# Patient Record
Sex: FEMALE | Race: WHITE | NOT HISPANIC OR LATINO | URBAN - METROPOLITAN AREA
[De-identification: names, ages, dates, MRNs, and addresses within clinical notes are randomized per-mention and may not be internally consistent; named-entity substitution may affect disease eponyms.]

---

## 2017-12-13 ENCOUNTER — OFFICE (OUTPATIENT)
Dept: URBAN - METROPOLITAN AREA CLINIC 64 | Facility: CLINIC | Age: 62
End: 2017-12-13

## 2017-12-13 VITALS
WEIGHT: 161 LBS | SYSTOLIC BLOOD PRESSURE: 133 MMHG | HEART RATE: 88 BPM | DIASTOLIC BLOOD PRESSURE: 85 MMHG | HEIGHT: 65 IN

## 2017-12-13 DIAGNOSIS — R10.12 LEFT UPPER QUADRANT PAIN: ICD-10-CM

## 2017-12-13 DIAGNOSIS — K62.5 HEMORRHAGE OF ANUS AND RECTUM: ICD-10-CM

## 2017-12-13 DIAGNOSIS — K30 FUNCTIONAL DYSPEPSIA: ICD-10-CM

## 2017-12-13 PROCEDURE — 99202 OFFICE O/P NEW SF 15 MIN: CPT | Performed by: NURSE PRACTITIONER

## 2017-12-13 RX ORDER — HYDROCORTISONE ACETATE 25 MG/1
25 SUPPOSITORY RECTAL
Qty: 10 | Refills: 1 | Status: COMPLETED
Start: 2017-12-13 | End: 2021-11-19

## 2017-12-13 RX ORDER — ONDANSETRON HYDROCHLORIDE 4 MG/1
16 TABLET, ORALLY DISINTEGRATING ORAL
Qty: 60 | Refills: 3 | Status: COMPLETED
Start: 2017-12-13 | End: 2021-11-19

## 2018-02-12 ENCOUNTER — HOSPITAL ENCOUNTER (OUTPATIENT)
Dept: PREOP | Facility: HOSPITAL | Age: 63
Setting detail: HOSPITAL OUTPATIENT SURGERY
Discharge: HOME | End: 2018-02-12
Attending: INTERNAL MEDICINE | Admitting: INTERNAL MEDICINE

## 2018-02-12 ENCOUNTER — ON CAMPUS - OUTPATIENT (OUTPATIENT)
Dept: URBAN - METROPOLITAN AREA HOSPITAL 85 | Facility: HOSPITAL | Age: 63
End: 2018-02-12

## 2018-02-12 DIAGNOSIS — K31.7 POLYP OF STOMACH AND DUODENUM: ICD-10-CM

## 2018-02-12 DIAGNOSIS — K62.5 HEMORRHAGE OF ANUS AND RECTUM: ICD-10-CM

## 2018-02-12 DIAGNOSIS — K62.6 ULCER OF ANUS AND RECTUM: ICD-10-CM

## 2018-02-12 DIAGNOSIS — K62.1 RECTAL POLYP: ICD-10-CM

## 2018-02-12 DIAGNOSIS — K29.70 GASTRITIS, UNSPECIFIED, WITHOUT BLEEDING: ICD-10-CM

## 2018-02-12 DIAGNOSIS — K30 FUNCTIONAL DYSPEPSIA: ICD-10-CM

## 2018-02-12 DIAGNOSIS — K62.4 STENOSIS OF ANUS AND RECTUM: ICD-10-CM

## 2018-02-12 DIAGNOSIS — R10.12 LEFT UPPER QUADRANT PAIN: ICD-10-CM

## 2018-02-12 PROCEDURE — 43239 EGD BIOPSY SINGLE/MULTIPLE: CPT | Performed by: INTERNAL MEDICINE

## 2018-02-12 PROCEDURE — 45385 COLONOSCOPY W/LESION REMOVAL: CPT | Performed by: INTERNAL MEDICINE

## 2018-02-12 PROCEDURE — 45381 COLONOSCOPY SUBMUCOUS NJX: CPT | Mod: 59 | Performed by: INTERNAL MEDICINE

## 2021-11-19 ENCOUNTER — OFFICE (OUTPATIENT)
Dept: URBAN - METROPOLITAN AREA CLINIC 64 | Facility: CLINIC | Age: 66
End: 2021-11-19

## 2021-11-19 VITALS
HEIGHT: 65 IN | SYSTOLIC BLOOD PRESSURE: 160 MMHG | HEART RATE: 86 BPM | DIASTOLIC BLOOD PRESSURE: 101 MMHG | WEIGHT: 164 LBS

## 2021-11-19 DIAGNOSIS — K62.5 HEMORRHAGE OF ANUS AND RECTUM: ICD-10-CM

## 2021-11-19 PROCEDURE — 99203 OFFICE O/P NEW LOW 30 MIN: CPT | Performed by: NURSE PRACTITIONER

## 2021-11-19 RX ORDER — HYDROCORTISONE ACETATE 25 MG/1
25 SUPPOSITORY RECTAL
Qty: 14 | Refills: 1 | Status: ACTIVE
Start: 2021-11-19

## 2022-08-02 RX ORDER — OMEPRAZOLE 40 MG/1
40 CAPSULE, DELAYED RELEASE ORAL DAILY
Qty: 30 CAPSULE | Refills: 2 | Status: SHIPPED | OUTPATIENT
Start: 2022-08-02 | End: 2022-09-14 | Stop reason: SDUPTHER

## 2022-08-02 RX ORDER — OMEPRAZOLE 40 MG/1
40 CAPSULE, DELAYED RELEASE ORAL DAILY
COMMUNITY
End: 2022-08-02 | Stop reason: SDUPTHER

## 2022-08-02 NOTE — TELEPHONE ENCOUNTER
Rx Refill Note  Requested Prescriptions     Pending Prescriptions Disp Refills   • omeprazole (priLOSEC) 40 MG capsule       Sig: Take 1 capsule by mouth Daily.      Last office visit with prescribing clinician: Visit date not found      Next office visit with prescribing clinician: 9/8/2022            Sarahi Saravia Rep  08/02/22, 10:57 EDT

## 2022-08-16 RX ORDER — FEXOFENADINE HCL 180 MG/1
180 TABLET ORAL DAILY
COMMUNITY
End: 2022-09-14 | Stop reason: ALTCHOICE

## 2022-08-16 RX ORDER — VALSARTAN AND HYDROCHLOROTHIAZIDE 160; 12.5 MG/1; MG/1
1 TABLET, FILM COATED ORAL DAILY
COMMUNITY
End: 2022-09-14 | Stop reason: SDUPTHER

## 2022-08-23 ENCOUNTER — ON CAMPUS - OUTPATIENT (OUTPATIENT)
Dept: URBAN - METROPOLITAN AREA HOSPITAL 85 | Facility: HOSPITAL | Age: 67
End: 2022-08-23

## 2022-08-23 ENCOUNTER — ANESTHESIA EVENT (OUTPATIENT)
Dept: GASTROENTEROLOGY | Facility: HOSPITAL | Age: 67
End: 2022-08-23

## 2022-08-23 ENCOUNTER — ANESTHESIA (OUTPATIENT)
Dept: GASTROENTEROLOGY | Facility: HOSPITAL | Age: 67
End: 2022-08-23

## 2022-08-23 ENCOUNTER — HOSPITAL ENCOUNTER (OUTPATIENT)
Facility: HOSPITAL | Age: 67
Setting detail: HOSPITAL OUTPATIENT SURGERY
Discharge: HOME | End: 2022-08-23
Attending: INTERNAL MEDICINE | Admitting: INTERNAL MEDICINE

## 2022-08-23 VITALS
WEIGHT: 150 LBS | DIASTOLIC BLOOD PRESSURE: 95 MMHG | RESPIRATION RATE: 12 BRPM | OXYGEN SATURATION: 97 % | TEMPERATURE: 97.6 F | HEIGHT: 66 IN | BODY MASS INDEX: 24.11 KG/M2 | SYSTOLIC BLOOD PRESSURE: 185 MMHG | HEART RATE: 65 BPM

## 2022-08-23 DIAGNOSIS — R19.4 CHANGE IN BOWEL HABIT: ICD-10-CM

## 2022-08-23 DIAGNOSIS — K57.30 DIVERTICULOSIS OF LARGE INTESTINE WITHOUT PERFORATION OR ABS: ICD-10-CM

## 2022-08-23 DIAGNOSIS — D12.3 BENIGN NEOPLASM OF TRANSVERSE COLON: ICD-10-CM

## 2022-08-23 DIAGNOSIS — K64.8 OTHER HEMORRHOIDS: ICD-10-CM

## 2022-08-23 DIAGNOSIS — Z86.010 PERSONAL HISTORY OF COLONIC POLYPS: ICD-10-CM

## 2022-08-23 DIAGNOSIS — K62.5 RECTAL BLEEDING: ICD-10-CM

## 2022-08-23 DIAGNOSIS — K62.5 HEMORRHAGE OF ANUS AND RECTUM: ICD-10-CM

## 2022-08-23 PROCEDURE — 45385 COLONOSCOPY W/LESION REMOVAL: CPT | Performed by: INTERNAL MEDICINE

## 2022-08-23 PROCEDURE — 88305 TISSUE EXAM BY PATHOLOGIST: CPT | Performed by: INTERNAL MEDICINE

## 2022-08-23 PROCEDURE — 25010000002 PROPOFOL 10 MG/ML EMULSION: Performed by: ANESTHESIOLOGY

## 2022-08-23 RX ORDER — PROPOFOL 10 MG/ML
VIAL (ML) INTRAVENOUS AS NEEDED
Status: DISCONTINUED | OUTPATIENT
Start: 2022-08-23 | End: 2022-08-23 | Stop reason: SURG

## 2022-08-23 RX ORDER — SODIUM CHLORIDE 9 MG/ML
9 INJECTION, SOLUTION INTRAVENOUS CONTINUOUS
Status: DISCONTINUED | OUTPATIENT
Start: 2022-08-23 | End: 2022-08-23 | Stop reason: HOSPADM

## 2022-08-23 RX ORDER — SODIUM CHLORIDE 0.9 % (FLUSH) 0.9 %
3-10 SYRINGE (ML) INJECTION AS NEEDED
Status: DISCONTINUED | OUTPATIENT
Start: 2022-08-23 | End: 2022-08-23 | Stop reason: HOSPADM

## 2022-08-23 RX ORDER — SODIUM CHLORIDE 0.9 % (FLUSH) 0.9 %
3 SYRINGE (ML) INJECTION EVERY 12 HOURS SCHEDULED
Status: DISCONTINUED | OUTPATIENT
Start: 2022-08-23 | End: 2022-08-23 | Stop reason: HOSPADM

## 2022-08-23 RX ORDER — SODIUM CHLORIDE 9 MG/ML
30 INJECTION, SOLUTION INTRAVENOUS CONTINUOUS PRN
Status: DISCONTINUED | OUTPATIENT
Start: 2022-08-23 | End: 2022-08-23 | Stop reason: HOSPADM

## 2022-08-23 RX ORDER — ONDANSETRON 2 MG/ML
4 INJECTION INTRAMUSCULAR; INTRAVENOUS ONCE AS NEEDED
Status: DISCONTINUED | OUTPATIENT
Start: 2022-08-23 | End: 2022-08-23 | Stop reason: HOSPADM

## 2022-08-23 RX ORDER — SODIUM CHLORIDE 9 MG/ML
INJECTION, SOLUTION INTRAVENOUS CONTINUOUS PRN
Status: DISCONTINUED | OUTPATIENT
Start: 2022-08-23 | End: 2022-08-23 | Stop reason: SURG

## 2022-08-23 RX ADMIN — PROPOFOL 100 MG: 10 INJECTION, EMULSION INTRAVENOUS at 08:37

## 2022-08-23 RX ADMIN — PROPOFOL 50 MG: 10 INJECTION, EMULSION INTRAVENOUS at 08:47

## 2022-08-23 RX ADMIN — SODIUM CHLORIDE 9 ML/HR: 9 INJECTION, SOLUTION INTRAVENOUS at 07:56

## 2022-08-23 RX ADMIN — SODIUM CHLORIDE: 0.9 INJECTION, SOLUTION INTRAVENOUS at 08:34

## 2022-08-23 RX ADMIN — PROPOFOL 50 MG: 10 INJECTION, EMULSION INTRAVENOUS at 08:42

## 2022-08-23 NOTE — ANESTHESIA PREPROCEDURE EVALUATION
Anesthesia Evaluation     Patient summary reviewed and Nursing notes reviewed   NPO Solid Status: > 8 hours  NPO Liquid Status: > 2 hours           Airway   Mallampati: II  TM distance: >3 FB  Neck ROM: full  No difficulty expected  Dental - normal exam     Pulmonary - negative pulmonary ROS and normal exam   Cardiovascular - normal exam    (+) hypertension, hyperlipidemia,       Neuro/Psych- negative ROS  GI/Hepatic/Renal/Endo    (+)  GERD,      Musculoskeletal (-) negative ROS    Abdominal  - normal exam    Bowel sounds: normal.   Substance History - negative use     OB/GYN negative ob/gyn ROS         Other                        Anesthesia Plan    ASA 2     MAC   total IV anesthesia  intravenous induction     Anesthetic plan, risks, benefits, and alternatives have been provided, discussed and informed consent has been obtained with: patient.  Pre-procedure education provided      CODE STATUS:

## 2022-08-23 NOTE — OP NOTE
COLONOSCOPY Procedure Report    Patient Name:  Meenakshi Pichardo  YOB: 1955    Date of Surgery:  8/23/2022     Pre-Op Diagnosis:  Rectal bleeding and change in bowel habits       Post Op Diagnosis:  Diverticulosis, colon polyps, rectal prolapse, internal hemorrhoids    Procedure/CPT® Codes:      Procedure(s):  COLONOSCOPY WITH COLD SNARE POLYPECTOMY X 3    Staff:  Surgeon(s):  Shireen Gruber MD         Anesthesia: Monitored Anesthesia Care    Implants:    Nothing was implanted during the procedure    Specimen:        See below    No blood loss    Complications:  None    Description of Procedure:  Informed consent was obtained for the procedure, including sedation.  Risks of perforation, hemorrhage, adverse drug reaction and aspiration were discussed.  The patient was brought into the endoscopy suite. Continuous cardiopulmonary monitoring was performed.  The patient was placed in the left lateral decubitus position. After adequate sedation was attained, the digital rectal exam was performed which was normal with decreased anal sphincter tone subjectively.  Subsequently, the Olympus colonoscope was inserted into the patient's rectum and advanced to the level of the cecum and terminal ileum without difficulty.  The bowel prep was excellent.  Circumferential examination of the patient's colon was performed on scope withdrawal.  A retroflex exam was performed in the rectum which edematous fold with overlying erosions suspicious for evidence of intermittent rectal prolapse; internal hemorrhoids also identified.  The bowel was decompressed, the scope was withdrawn from the patient, and the patient tolerated the procedure well. There were no immediate post-operative complications.     Findings:    Normal mucosa of the terminal ileum  Mild diverticulosis of the ascending and sigmoid colon  Transverse colon polyp x1, 5 mm in size, removed in single piece fashion with cold snare polypectomy  Descending  colon polyps x2, 7 to 8 mm in size, removed in single piece fashion with cold snare polypectomy  In the distal rectum approximating the anal verge, there was an edematous fold with overlying erosions suspicious for evidence of intermittent rectal prolapse; internal hemorrhoids also identified      Impression:  Colon polyps x3, mild diverticulosis of the ascending and sigmoid colon, evidence suspicious for intermittent rectal prolapse of mild severity, internal hemorrhoids    Recommendations:  Follow-up histopathology  High-fiber diet  Repeat colonoscopy in 3 years of polyps are adenomas, 5 years if they are hyperplastic.  Kegel exercises  Topical therapy as needed for rectal bleeding  Trial of probiotics for mucus in the stool      Shireen Gruber MD     Date: 8/23/2022  Time: 08:53 EDT

## 2022-08-23 NOTE — ANESTHESIA POSTPROCEDURE EVALUATION
Patient: Meenakshi Pichardo    Procedure Summary     Date: 08/23/22 Room / Location: [facility] ENDOSCOPY 4 / [facility] ENDOSCOPY    Anesthesia Start: 0833 Anesthesia Stop: 0853    Procedure: COLONOSCOPY WITH POLYPECTOMY X 3 (N/A ) Diagnosis:       Change in bowel habit      Rectal bleeding      Personal history of colonic polyps      (Change in bowel habit [R19.4])      (Rectal bleeding [K62.5])      (Personal history of colonic polyps [Z86.010])    Surgeons: Shireen Gruber MD Provider: Johan Mckeon MD    Anesthesia Type: MAC ASA Status: 2          Anesthesia Type: MAC    Vitals  No vitals data found for the desired time range.          Post Anesthesia Care and Evaluation    Patient location during evaluation: PACU  Patient participation: complete - patient participated  Level of consciousness: awake  Pain scale: See nurse's notes for pain score.  Pain management: adequate    Airway patency: patent  Anesthetic complications: No anesthetic complications  PONV Status: none  Cardiovascular status: acceptable  Respiratory status: acceptable  Hydration status: acceptable    Comments: Patient seen and examined postoperatively; vital signs stable; SpO2 greater than or equal to 90%; cardiopulmonary status stable; nausea/vomiting adequately controlled; pain adequately controlled; no apparent anesthesia complications; patient discharged from anesthesia care when discharge criteria were met

## 2022-08-23 NOTE — H&P
" LOS: 0 days   Patient Care Team:  Suzie Ching MD as PCP - General (Family Medicine)      Subjective     Interval History:     Subjective: Rectal bleeding and change in bowel habits      ROS:   No chest pain, shortness of breath, or cough. \       Medication Review:     Current Facility-Administered Medications:   •  sodium chloride 0.9 % flush 3 mL, 3 mL, Intravenous, Q12H, Shireen Gruber MD  •  sodium chloride 0.9 % flush 3-10 mL, 3-10 mL, Intravenous, PRN, Shireen Gruber MD  •  sodium chloride 0.9 % infusion, 9 mL/hr, Intravenous, Continuous, Shireen Gruber MD, Last Rate: 9 mL/hr at 08/23/22 0756, 9 mL/hr at 08/23/22 0756  •  sodium chloride 0.9 % infusion, 30 mL/hr, Intravenous, Continuous PRN, Shireen Gruber MD      Objective     Vital Signs  Vitals:    08/16/22 1559 08/23/22 0745   BP:  (!) 189/99   BP Location:  Left arm   Patient Position:  Lying   Pulse:  75   Resp:  11   Temp:  97.6 °F (36.4 °C)   TempSrc:  Oral   SpO2:  96%   Weight: 68 kg (150 lb)    Height: 167.6 cm (66\")        Physical Exam:    General Appearance:    Awake and alert, in no acute distress   Head:    Normocephalic, without obvious abnormality   Eyes:          Conjunctivae normal, anicteric sclera   Throat:   No oral lesions, no thrush, oral mucosa moist   Neck:   No adenopathy, supple, no JVD   Lungs:     respirations regular, even and unlabored   Abdomen:     Soft, non-tender, no rebound or guarding, non-distended, no hepatosplenomegaly   Rectal:     Deferred   Extremities:   No edema, no cyanosis   Skin:   No bruising or rash, no jaundice        Results Review:    Lab Results (last 24 hours)     ** No results found for the last 24 hours. **          Imaging Results (Last 24 Hours)     ** No results found for the last 24 hours. **            Assessment & Plan   Proceed with scope and MAC anesthesia        Shireen Gruber MD  08/23/22  08:35 EDT  "
Statement Selected

## 2022-08-23 NOTE — DISCHARGE INSTRUCTIONS
A responsible adult should stay with you and you should rest quietly for the rest of the day.    Do not drink alcohol, drive, operate any heavy machinery or power tools or make any legal/important decisions for the next 24 hours.     Progress your diet as tolerated.  If you begin to experience severe pain, increased shortness of breath, racing heartbeat or a fever above 101 F, seek immediate medical attention.     Follow up with MD as instructed. Call office for results in 5 days for biopsy results    Impression:  Colon polyps x3, mild diverticulosis of the ascending and sigmoid colon, evidence suspicious for intermittent rectal prolapse of mild severity, internal hemorrhoids     Recommendations:  Follow-up histopathology  High-fiber diet  Repeat colonoscopy in 3 years of polyps are adenomas, 5 years if they are hyperplastic.  Kegel exercises  Topical therapy as needed for rectal bleeding  Trial of probiotics for mucus in the stool

## 2022-08-24 LAB
LAB AP CASE REPORT: NORMAL
PATH REPORT.FINAL DX SPEC: NORMAL
PATH REPORT.GROSS SPEC: NORMAL

## 2022-09-14 ENCOUNTER — OFFICE VISIT (OUTPATIENT)
Dept: FAMILY MEDICINE CLINIC | Age: 67
End: 2022-09-14

## 2022-09-14 VITALS
BODY MASS INDEX: 26.07 KG/M2 | HEART RATE: 79 BPM | OXYGEN SATURATION: 97 % | HEIGHT: 66 IN | WEIGHT: 162.2 LBS | TEMPERATURE: 97.4 F | SYSTOLIC BLOOD PRESSURE: 116 MMHG | RESPIRATION RATE: 16 BRPM | DIASTOLIC BLOOD PRESSURE: 70 MMHG

## 2022-09-14 DIAGNOSIS — I10 BENIGN HYPERTENSION: Primary | ICD-10-CM

## 2022-09-14 DIAGNOSIS — K21.9 GASTROESOPHAGEAL REFLUX DISEASE WITHOUT ESOPHAGITIS: ICD-10-CM

## 2022-09-14 DIAGNOSIS — J30.1 NON-SEASONAL ALLERGIC RHINITIS DUE TO POLLEN: ICD-10-CM

## 2022-09-14 DIAGNOSIS — E78.2 MIXED HYPERLIPIDEMIA: ICD-10-CM

## 2022-09-14 PROCEDURE — 99214 OFFICE O/P EST MOD 30 MIN: CPT | Performed by: FAMILY MEDICINE

## 2022-09-14 RX ORDER — CETIRIZINE HYDROCHLORIDE 10 MG/1
10 TABLET ORAL DAILY
COMMUNITY
End: 2022-12-15 | Stop reason: SDUPTHER

## 2022-09-14 RX ORDER — MELOXICAM 15 MG/1
15 TABLET ORAL DAILY PRN
COMMUNITY
End: 2023-03-22

## 2022-09-14 RX ORDER — OMEPRAZOLE 40 MG/1
40 CAPSULE, DELAYED RELEASE ORAL DAILY
Qty: 90 CAPSULE | Refills: 0 | Status: SHIPPED | OUTPATIENT
Start: 2022-09-14 | End: 2022-12-15 | Stop reason: SDUPTHER

## 2022-09-14 RX ORDER — MELATONIN
1000 DAILY
COMMUNITY

## 2022-09-14 RX ORDER — MONTELUKAST SODIUM 10 MG/1
10 TABLET ORAL NIGHTLY
COMMUNITY
End: 2022-09-14 | Stop reason: SDUPTHER

## 2022-09-14 RX ORDER — ATORVASTATIN CALCIUM 10 MG/1
10 TABLET, FILM COATED ORAL DAILY
Qty: 90 TABLET | Refills: 0 | Status: SHIPPED | OUTPATIENT
Start: 2022-09-14 | End: 2022-12-15 | Stop reason: SDUPTHER

## 2022-09-14 RX ORDER — FLUTICASONE PROPIONATE 50 MCG
1 SPRAY, SUSPENSION (ML) NASAL DAILY PRN
COMMUNITY
End: 2022-12-15 | Stop reason: SDUPTHER

## 2022-09-14 RX ORDER — VALSARTAN AND HYDROCHLOROTHIAZIDE 160; 12.5 MG/1; MG/1
1 TABLET, FILM COATED ORAL DAILY
Qty: 90 TABLET | Refills: 0 | Status: SHIPPED | OUTPATIENT
Start: 2022-09-14 | End: 2022-12-15 | Stop reason: SDUPTHER

## 2022-09-14 RX ORDER — MONTELUKAST SODIUM 10 MG/1
10 TABLET ORAL NIGHTLY
Qty: 90 TABLET | Refills: 0 | Status: SHIPPED | OUTPATIENT
Start: 2022-09-14 | End: 2022-09-16 | Stop reason: SDUPTHER

## 2022-09-14 RX ORDER — TACROLIMUS 1 MG/G
1 OINTMENT TOPICAL 2 TIMES DAILY PRN
COMMUNITY
End: 2022-12-15 | Stop reason: SDUPTHER

## 2022-09-14 NOTE — PROGRESS NOTES
Meenakshi Pichardo presents to [facility]      Chief Complaint  Hypertension, Hyperlipidemia  HPI     Respiratory:  SOA:  no. Exertional dyspnea: no.  Dyspnea at rest: no.  Flights of stairs climbable: 3. Walking 45 min daily for exercise.  Cardiology:  Chest pain: no.  Dizziness: no. Easily fatigued: no.  Pedal edema: no.  Light-headiness: no.  Symptoms of claudication: no.  Orthopnea: no.  Palpitations: no. Tachycardia: no.  Ophthalmology:  Last eye exam date: 6/2022. Vision changes: none.  Gastroenterology:  Heartburn: no. Requesting refill on Omeprazole.  Nausea: no.    Ran out of Singular.  Increased AM coughing and drainage.  Requesting refill      Current Outpatient Medications:   •  atorvastatin (LIPITOR) 10 MG tablet, Take 1 tablet by mouth Daily., Disp: 90 tablet, Rfl: 0  •  omeprazole (priLOSEC) 40 MG capsule, Take 1 capsule by mouth Daily., Disp: 90 capsule, Rfl: 0  •  valsartan-hydrochlorothiazide (DIOVAN-HCT) 160-12.5 MG per tablet, Take 1 tablet by mouth Daily., Disp: 90 tablet, Rfl: 0  •  montelukast (Singulair) 10 MG tablet, Take 1 tablet by mouth Every Night., Disp: 90 tablet, Rfl: 0     No Known Allergies     Past Medical History:   Diagnosis Date   • GERD (gastroesophageal reflux disease)    • Hyperlipidemia    • Hypertension    • Seasonal allergies        Past Surgical History:   Procedure Laterality Date   • BLADDER SUSPENSION     • BREAST LUMPECTOMY     • COLONOSCOPY N/A 8/23/2022    Procedure: COLONOSCOPY WITH POLYPECTOMY X 3;  Surgeon: Shireen Gruber MD;  Location: [facility] ENDOSCOPY;  Service: Gastroenterology;  Laterality: N/A;  POLYPS, DIVERTICULOSIS, HEMORRHOIDS, PROLAPSE   • HYSTERECTOMY          History reviewed. No pertinent family history.     Social History     Socioeconomic History   • Marital status:    Tobacco Use   • Smoking status: Never Smoker   Substance and Sexual Activity   • Alcohol use: Not Currently   • Drug use: Not Currently  "       ROS: As in HPI      Objective   Vital Signs:  /70 (BP Location: Left arm, Patient Position: Sitting, Cuff Size: Adult)   Pulse 79   Temp 97.4 °F (36.3 °C) (Infrared)   Resp 16   Ht 167.6 cm (66\")   Wt 73.6 kg (162 lb 3.2 oz)   SpO2 97%   BMI 26.18 kg/m²   Estimated body mass index is 26.18 kg/m² as calculated from the following:    Height as of this encounter: 167.6 cm (66\").    Weight as of this encounter: 73.6 kg (162 lb 3.2 oz).        Physical Exam  Constitutional:       Comments: No acute distress. Well kept. Pleasant, cheerful, relaxed, interactive, coherant   Neck:      Vascular: No JVD.   Cardiovascular:      Rate and Rhythm: Normal rate and regular rhythm.      Pulses:           Carotid pulses are 2+ on the right side and 2+ on the left side.       Radial pulses are 2+ on the right side and 2+ on the left side.        Femoral pulses are 0 on the right side and 0 on the left side.       Dorsalis pedis pulses are 1+ on the right side and 2+ on the left side.        Posterior tibial pulses are 2+ on the right side and 2+ on the left side.      Heart sounds: Normal heart sounds.      Comments: No lower extremity varicosities  Pulmonary:      Effort: Pulmonary effort is normal.      Breath sounds: Normal breath sounds.   Musculoskeletal:      Right lower leg: No edema.      Left lower leg: No edema.   Skin:     Comments: No dystrophic toenails. No integument changes of the shins or feet.  Toes are warm and pink         Result Review :                    Assessment and Plan   Diagnoses and all orders for this visit:    1. Benign hypertension (Primary)  Assessment & Plan:  MDM: Hypertension is Stable. Continue present medications without changes.    Orders:  -     valsartan-hydrochlorothiazide (DIOVAN-HCT) 160-12.5 MG per tablet; Take 1 tablet by mouth Daily.  Dispense: 90 tablet; Refill: 0    2. Mixed hyperlipidemia  Assessment & Plan:  MDM: Tolerating statin well.  Check fasting labs next " visit in 3 months    Orders:  -     atorvastatin (LIPITOR) 10 MG tablet; Take 1 tablet by mouth Daily.  Dispense: 90 tablet; Refill: 0    3. Non-seasonal allergic rhinitis due to pollen  Assessment & Plan:  MDM: Allergy symptoms worsen off Singular.  Refill medication and continue antihistamine    Orders:  -     montelukast (Singulair) 10 MG tablet; Take 1 tablet by mouth Every Night.  Dispense: 90 tablet; Refill: 0    4. Gastroesophageal reflux disease without esophagitis  -     omeprazole (priLOSEC) 40 MG capsule; Take 1 capsule by mouth Daily.  Dispense: 90 capsule; Refill: 0           Follow Up   Return in about 3 months (around 12/14/2022) for Cor, Fasting labs.    Patient was given instructions and counseling regarding her condition or for health maintenance advice. Please see specific information pulled into the AVS if appropriate.   There are no Patient Instructions on file for this visit.

## 2022-09-16 DIAGNOSIS — J30.1 NON-SEASONAL ALLERGIC RHINITIS DUE TO POLLEN: ICD-10-CM

## 2022-09-16 RX ORDER — MONTELUKAST SODIUM 10 MG/1
10 TABLET ORAL NIGHTLY
Qty: 30 TABLET | Refills: 0 | Status: SHIPPED | OUTPATIENT
Start: 2022-09-16 | End: 2022-12-15 | Stop reason: SDUPTHER

## 2022-09-16 NOTE — TELEPHONE ENCOUNTER
Patient called into office and is requesting a 30 day supply of her Singulair to be sent to [facility] pharmacy as to where she is going camping this weekend, and will need it. Patient stated that she has not received her rx from [facility], but should be receiving it sometime within the next week.       Rx Refill Note  Requested Prescriptions      No prescriptions requested or ordered in this encounter      Last office visit with prescribing clinician: 9/14/2022      Next office visit with prescribing clinician: 12/15/2022            Monica Gomez LPN  09/16/22, 15:25 EDT

## 2022-12-15 ENCOUNTER — OFFICE VISIT (OUTPATIENT)
Dept: FAMILY MEDICINE CLINIC | Age: 67
End: 2022-12-15

## 2022-12-15 VITALS
TEMPERATURE: 98 F | HEART RATE: 75 BPM | OXYGEN SATURATION: 97 % | HEIGHT: 65 IN | RESPIRATION RATE: 20 BRPM | DIASTOLIC BLOOD PRESSURE: 100 MMHG | SYSTOLIC BLOOD PRESSURE: 180 MMHG | WEIGHT: 166 LBS | BODY MASS INDEX: 27.66 KG/M2

## 2022-12-15 DIAGNOSIS — J30.1 NON-SEASONAL ALLERGIC RHINITIS DUE TO POLLEN: ICD-10-CM

## 2022-12-15 DIAGNOSIS — M65.331 TRIGGER MIDDLE FINGER OF RIGHT HAND: ICD-10-CM

## 2022-12-15 DIAGNOSIS — E78.2 MIXED HYPERLIPIDEMIA: ICD-10-CM

## 2022-12-15 DIAGNOSIS — L98.9 INFLAMMATORY DERMATOSIS: ICD-10-CM

## 2022-12-15 DIAGNOSIS — I10 BENIGN HYPERTENSION: Primary | ICD-10-CM

## 2022-12-15 DIAGNOSIS — K21.9 GASTROESOPHAGEAL REFLUX DISEASE WITHOUT ESOPHAGITIS: ICD-10-CM

## 2022-12-15 PROCEDURE — 80061 LIPID PANEL: CPT | Performed by: FAMILY MEDICINE

## 2022-12-15 PROCEDURE — 20550 NJX 1 TENDON SHEATH/LIGAMENT: CPT | Performed by: FAMILY MEDICINE

## 2022-12-15 PROCEDURE — 84443 ASSAY THYROID STIM HORMONE: CPT | Performed by: FAMILY MEDICINE

## 2022-12-15 PROCEDURE — 80053 COMPREHEN METABOLIC PANEL: CPT | Performed by: FAMILY MEDICINE

## 2022-12-15 PROCEDURE — 82607 VITAMIN B-12: CPT | Performed by: FAMILY MEDICINE

## 2022-12-15 PROCEDURE — 99214 OFFICE O/P EST MOD 30 MIN: CPT | Performed by: FAMILY MEDICINE

## 2022-12-15 RX ORDER — MONTELUKAST SODIUM 10 MG/1
10 TABLET ORAL NIGHTLY
Qty: 90 TABLET | Refills: 3 | Status: SHIPPED | OUTPATIENT
Start: 2022-12-15 | End: 2023-03-22 | Stop reason: SDUPTHER

## 2022-12-15 RX ORDER — VALSARTAN AND HYDROCHLOROTHIAZIDE 160; 12.5 MG/1; MG/1
1 TABLET, FILM COATED ORAL DAILY
Qty: 90 TABLET | Refills: 0 | Status: SHIPPED | OUTPATIENT
Start: 2022-12-15

## 2022-12-15 RX ORDER — LIDOCAINE HYDROCHLORIDE 10 MG/ML
10 INJECTION, SOLUTION INFILTRATION; PERINEURAL ONCE
Status: COMPLETED | OUTPATIENT
Start: 2022-12-15 | End: 2022-12-15

## 2022-12-15 RX ORDER — ATORVASTATIN CALCIUM 10 MG/1
10 TABLET, FILM COATED ORAL DAILY
Qty: 90 TABLET | Refills: 0 | Status: SHIPPED | OUTPATIENT
Start: 2022-12-15 | End: 2023-03-22 | Stop reason: SDUPTHER

## 2022-12-15 RX ORDER — MELOXICAM 15 MG/1
15 TABLET ORAL DAILY PRN
Qty: 90 TABLET | Refills: 0 | Status: CANCELLED | OUTPATIENT
Start: 2022-12-15

## 2022-12-15 RX ORDER — TACROLIMUS 1 MG/G
1 OINTMENT TOPICAL 2 TIMES DAILY PRN
Qty: 60 G | Refills: 0 | Status: SHIPPED | OUTPATIENT
Start: 2022-12-15

## 2022-12-15 RX ORDER — METHYLPREDNISOLONE ACETATE 40 MG/ML
20 INJECTION, SUSPENSION INTRA-ARTICULAR; INTRALESIONAL; INTRAMUSCULAR; SOFT TISSUE ONCE
Status: COMPLETED | OUTPATIENT
Start: 2022-12-15 | End: 2022-12-15

## 2022-12-15 RX ORDER — FLUTICASONE PROPIONATE 50 MCG
1 SPRAY, SUSPENSION (ML) NASAL DAILY PRN
Qty: 16 G | Refills: 0 | Status: SHIPPED | OUTPATIENT
Start: 2022-12-15

## 2022-12-15 RX ORDER — CETIRIZINE HYDROCHLORIDE 10 MG/1
10 TABLET ORAL DAILY
Qty: 90 TABLET | Refills: 3 | Status: SHIPPED | OUTPATIENT
Start: 2022-12-15 | End: 2023-03-22 | Stop reason: SDUPTHER

## 2022-12-15 RX ORDER — OMEPRAZOLE 40 MG/1
40 CAPSULE, DELAYED RELEASE ORAL DAILY
Qty: 90 CAPSULE | Refills: 0 | Status: SHIPPED | OUTPATIENT
Start: 2022-12-15 | End: 2023-03-22 | Stop reason: SDUPTHER

## 2022-12-15 RX ADMIN — LIDOCAINE HYDROCHLORIDE 10 MG: 10 INJECTION, SOLUTION INFILTRATION; PERINEURAL at 08:49

## 2022-12-15 RX ADMIN — METHYLPREDNISOLONE ACETATE 20 MG: 40 INJECTION, SUSPENSION INTRA-ARTICULAR; INTRALESIONAL; INTRAMUSCULAR; SOFT TISSUE at 16:45

## 2022-12-15 NOTE — ASSESSMENT & PLAN NOTE
MDM: BP not in normal range, today.  Patient had not taken her medication, this morning.  She is taking Advil for pain in fingers.  Patient advised that NSAID can result in elevated BP.  She is instructed not to miss her morning medications.  She is not to take any NSAIDs, as delineated. She may take Tylenol or ASA prn pain.  Kelly BP in 1 week

## 2022-12-15 NOTE — PROGRESS NOTES
Meenakshi Pichardo presents to [facility] PR[facility]      Chief Complaint  Hypertension, Trigger finger    HPI     Respiratory:  SOA:  no. Exertional dyspnea: no.  Dyspnea at rest: no.  Flights of stairs climbable: 3  Cardiology:  Chest pain: no.  Dizziness: no. Easily fatigued: no.  Pedal edema: no.  Light-headiness: no.  Symptoms of claudication: no.  Orthopnea: no.  Palpitations: no. Tachycardia: no.  Ophthalmology:  Last eye exam date: 2022 . Vision changes: none.  Gastroenterology:  Heartburn: no.  Nausea: no.    Right 3rd finger DIP and PIP joint are locking up, again.  Starting to be painful.  Last trigger finger injection: 2/2/22.  Taking Advil for the pain    Current Outpatient Medications:   •  atorvastatin (LIPITOR) 10 MG tablet, Take 1 tablet by mouth Daily., Disp: 90 tablet, Rfl: 0  •  cetirizine (zyrTEC) 10 MG tablet, Take 1 tablet by mouth Daily., Disp: 90 tablet, Rfl: 3  •  cholecalciferol (VITAMIN D3) 25 MCG (1000 UT) tablet, Take 1,000 Units by mouth Daily., Disp: , Rfl:   •  fluticasone (FLONASE) 50 MCG/ACT nasal spray, 1 spray into the nostril(s) as directed by provider Daily As Needed for Allergies., Disp: 16 g, Rfl: 0  •  meloxicam (MOBIC) 15 MG tablet, Take 15 mg by mouth Daily As Needed for Mild Pain., Disp: , Rfl:   •  montelukast (Singulair) 10 MG tablet, Take 1 tablet by mouth Every Night., Disp: 90 tablet, Rfl: 3  •  omeprazole (priLOSEC) 40 MG capsule, Take 1 capsule by mouth Daily., Disp: 90 capsule, Rfl: 0  •  tacrolimus (PROTOPIC) 0.1 % ointment, Apply 1 application topically to the appropriate area as directed 2 (Two) Times a Day As Needed (Inflammatory dermatitis)., Disp: 60 g, Rfl: 0  •  valsartan-hydrochlorothiazide (DIOVAN-HCT) 160-12.5 MG per tablet, Take 1 tablet by mouth Daily., Disp: 90 tablet, Rfl: 0    Current Facility-Administered Medications:   •  lidocaine (XYLOCAINE) 1 % injection 10 mg, 10 mg, Subcutaneous, Once, Buhse, Suzie Doreen, MD  •   methylPREDNISolone acetate (DEPO-medrol) injection 20 mg, 20 mg, Intra-tendon, Once, Suzie Ching MD     Allergies   Allergen Reactions   • Percocet [Oxycodone-Acetaminophen] Nausea And Vomiting and Other (See Comments)     Muscle Weakness    • Contrast Dye Hives and Swelling        Past Medical History:   Diagnosis Date   • Vitamin D deficiency    • Wears glasses        Past Surgical History:   Procedure Laterality Date   • BLADDER SUSPENSION     • BREAST LUMPECTOMY  1981    Benign breast nodule   • COLONOSCOPY N/A 08/23/2022    Procedure: COLONOSCOPY WITH POLYPECTOMY X 3;  Surgeon: Shireen Gruber MD;  Location: [facility] ENDOSCOPY;  Service: Gastroenterology;  Laterality: N/A;  POLYPS, DIVERTICULOSIS, HEMORRHOIDS, PROLAPSE   • COLONOSCOPY W/ BIOPSIES  06/2011    Benign Polyps   • COLONOSCOPY W/ BIOPSIES  02/2018    Benign Polyps; Repeat Colonoscopy 2023   • ENDOSCOPY  06/2011   • ENDOSCOPY  02/2018    Duodenal ulcer   • TOTAL ABDOMINAL HYSTERECTOMY WITH SALPINGO OOPHORECTOMY Bilateral 1982    ENDOMETRIOSIS   • TUMOR EXCISION  09/2022    On top lip- benign        Family History   Problem Relation Age of Onset   • Cancer Mother    • Hypertension Sister    • Diabetes Sister    • Diabetes Sister    • Kidney failure Sister         Required dialysis   • Fibromyalgia Sister    • Hypertension Brother    • No Known Problems Brother    • No Known Problems Son    • No Known Problems Son    • No Known Problems Son    • No Known Problems Son         Social History     Socioeconomic History   • Marital status:    Tobacco Use   • Smoking status: Never   • Smokeless tobacco: Never   Vaping Use   • Vaping Use: Never used   Substance and Sexual Activity   • Alcohol use: Never   • Drug use: Not Currently     Comment: Tried Marijuana - NO current illegal/recreational at this time.   • Sexual activity: Defer        ROS: As in HPI      Objective   Vital Signs:  /100 (BP Location: Left arm, Patient Position:  "Sitting, Cuff Size: Adult)   Pulse 75   Temp 98 °F (36.7 °C) (Oral)   Resp 20   Ht 165.1 cm (65\")   Wt 75.3 kg (166 lb)   SpO2 97%   BMI 27.62 kg/m²   Estimated body mass index is 27.62 kg/m² as calculated from the following:    Height as of this encounter: 165.1 cm (65\").    Weight as of this encounter: 75.3 kg (166 lb).    Vitals:    12/15/22 0811 12/15/22 1000   BP: (!) 164/120 180/100   BP Location: Right arm Left arm   Patient Position: Sitting Sitting   Cuff Size: Adult Adult   Pulse: 75    Resp: 20    Temp: 98 °F (36.7 °C)    TempSrc: Oral    SpO2: 97%    Weight: 75.3 kg (166 lb)    Height: 165.1 cm (65\")      Vitals:    12/15/22 0811 12/15/22 1000   BP: (!) 164/120 180/100   BP Location: Right arm Left arm   Patient Position: Sitting Sitting   Cuff Size: Adult Adult   Pulse: 75    Resp: 20    Temp: 98 °F (36.7 °C)    TempSrc: Oral    SpO2: 97%    Weight: 75.3 kg (166 lb)    Height: 165.1 cm (65\")          Physical Exam  General:  No acute distress, cheerful, well kept, interactive, good energy level.  Cardiac:  Heart regular rate and rhythm without murmur.  Respiratory:  Lungs clear to auscultation bilaterally and excellent air movement throughout.  Pulses:                            Right          Left         Bruit  Carotid                2+              2+           No  Radial                 2+              2+  Abd Ao               Not bounding  Femoral             Not palpable bilaterally  DP                      2+              2+  PT                      2+              2+  Venous:  Absent JVD. Absent lower extremity varicosities.  Dermatology:  Non-dystrophic toe nails. No Integument changes of the toes, feet, ankles, shins.  Lower Extremities:  No leg edema.  Toes are warm and pink    Procedure:  Palm cleansed with alcohol swab.  0.5 cc plain 1% lidocaine + 0.5 cc Depo Medrol 40 was injected 3/8\" flushed to the tendon on the volar aspect of the right metacarpal head using a 5/8\", 25 g needle. "  No resistance at the injection site.  Area rubbed to close the injection site. Coverlet placed.  Patient tolerated the injection, well.                          Assessment and Plan   Diagnoses and all orders for this visit:    1. Benign hypertension (Primary)  Assessment & Plan:  MDM: BP not in normal range, today.  Patient had not taken her medication, this morning.  She is taking Advil for pain in fingers.  Patient advised that NSAID can result in elevated BP.  She is instructed not to miss her morning medications.  She is not to take any NSAIDs, as delineated. She may take Tylenol or ASA prn pain.  Kelly BP in 1 week    Orders:  -     Comprehensive metabolic panel  -     TSH  -     valsartan-hydrochlorothiazide (DIOVAN-HCT) 160-12.5 MG per tablet; Take 1 tablet by mouth Daily.  Dispense: 90 tablet; Refill: 0    2. Mixed hyperlipidemia  Assessment & Plan:  MDM: Stable. Continue present medications without changes.      Orders:  -     Lipid Panel  -     atorvastatin (LIPITOR) 10 MG tablet; Take 1 tablet by mouth Daily.  Dispense: 90 tablet; Refill: 0    3. Gastroesophageal reflux disease without esophagitis  Assessment & Plan:  MDM: Stable. Continue present medications without changes.    Orders:  -     Vitamin B12  -     omeprazole (priLOSEC) 40 MG capsule; Take 1 capsule by mouth Daily.  Dispense: 90 capsule; Refill: 0    4. Trigger middle finger of right hand  Comments:  MDM: Continue to monitor.  Referral to specialist if no resolution  Orders:  -     lidocaine (XYLOCAINE) 1 % injection 10 mg  -     methylPREDNISolone acetate (DEPO-medrol) injection 20 mg    5. Non-seasonal allergic rhinitis due to pollen  Assessment & Plan:  MDM: Stable. Continue present medications without changes.    Orders:  -     cetirizine (zyrTEC) 10 MG tablet; Take 1 tablet by mouth Daily.  Dispense: 90 tablet; Refill: 3  -     montelukast (Singulair) 10 MG tablet; Take 1 tablet by mouth Every Night.  Dispense: 90 tablet; Refill: 3  -      fluticasone (FLONASE) 50 MCG/ACT nasal spray; 1 spray into the nostril(s) as directed by provider Daily As Needed for Allergies.  Dispense: 16 g; Refill: 0    6. Inflammatory dermatosis  Assessment & Plan:  MDM: Stable. Continue present medications without changes.    Orders:  -     tacrolimus (PROTOPIC) 0.1 % ointment; Apply 1 application topically to the appropriate area as directed 2 (Two) Times a Day As Needed (Inflammatory dermatitis).  Dispense: 60 g; Refill: 0               Follow Up   Return for F/U BP.    Patient was given instructions and counseling regarding her condition or for health maintenance advice. Please see specific information pulled into the AVS if appropriate.   There are no Patient Instructions on file for this visit.   Suzie Ching MD

## 2022-12-16 LAB
ALBUMIN SERPL-MCNC: 4.5 G/DL (ref 3.5–5.2)
ALBUMIN/GLOB SERPL: 1.6 G/DL
ALP SERPL-CCNC: 66 U/L (ref 39–117)
ALT SERPL W P-5'-P-CCNC: 19 U/L (ref 1–33)
ANION GAP SERPL CALCULATED.3IONS-SCNC: 10.8 MMOL/L (ref 5–15)
AST SERPL-CCNC: 21 U/L (ref 1–32)
BILIRUB SERPL-MCNC: 0.4 MG/DL (ref 0–1.2)
BUN SERPL-MCNC: 14 MG/DL (ref 8–23)
BUN/CREAT SERPL: 18.4 (ref 7–25)
CALCIUM SPEC-SCNC: 9.8 MG/DL (ref 8.6–10.5)
CHLORIDE SERPL-SCNC: 106 MMOL/L (ref 98–107)
CHOLEST SERPL-MCNC: 186 MG/DL (ref 0–200)
CO2 SERPL-SCNC: 26.2 MMOL/L (ref 22–29)
CREAT SERPL-MCNC: 0.76 MG/DL (ref 0.57–1)
EGFRCR SERPLBLD CKD-EPI 2021: 86 ML/MIN/1.73
GLOBULIN UR ELPH-MCNC: 2.8 GM/DL
GLUCOSE SERPL-MCNC: 82 MG/DL (ref 65–99)
HDLC SERPL-MCNC: 54 MG/DL (ref 40–60)
LDLC SERPL CALC-MCNC: 108 MG/DL (ref 0–100)
LDLC/HDLC SERPL: 1.94 {RATIO}
POTASSIUM SERPL-SCNC: 4.1 MMOL/L (ref 3.5–5.2)
PROT SERPL-MCNC: 7.3 G/DL (ref 6–8.5)
SODIUM SERPL-SCNC: 143 MMOL/L (ref 136–145)
TRIGL SERPL-MCNC: 135 MG/DL (ref 0–150)
TSH SERPL DL<=0.05 MIU/L-ACNC: 1.2 UIU/ML (ref 0.27–4.2)
VIT B12 BLD-MCNC: 328 PG/ML (ref 211–946)
VLDLC SERPL-MCNC: 24 MG/DL (ref 5–40)

## 2022-12-21 ENCOUNTER — OFFICE VISIT (OUTPATIENT)
Dept: FAMILY MEDICINE CLINIC | Age: 67
End: 2022-12-21

## 2022-12-21 VITALS
HEIGHT: 65 IN | SYSTOLIC BLOOD PRESSURE: 132 MMHG | DIASTOLIC BLOOD PRESSURE: 90 MMHG | OXYGEN SATURATION: 96 % | HEART RATE: 88 BPM | TEMPERATURE: 96.9 F | RESPIRATION RATE: 20 BRPM | BODY MASS INDEX: 27.16 KG/M2 | WEIGHT: 163 LBS

## 2022-12-21 DIAGNOSIS — I10 BENIGN HYPERTENSION: Primary | ICD-10-CM

## 2022-12-21 PROCEDURE — 99213 OFFICE O/P EST LOW 20 MIN: CPT | Performed by: FAMILY MEDICINE

## 2022-12-21 RX ORDER — HYDROCHLOROTHIAZIDE 12.5 MG/1
12.5 TABLET ORAL DAILY
Qty: 90 TABLET | Refills: 0 | Status: SHIPPED | OUTPATIENT
Start: 2022-12-21 | End: 2023-03-22 | Stop reason: SDUPTHER

## 2022-12-21 NOTE — ASSESSMENT & PLAN NOTE
Hypertension improved post D/C NSAID but not at goal.  Low dose HCTZ having little to nil affect.  Increase to 25 mg with Valsartan

## 2022-12-21 NOTE — PROGRESS NOTES
"Chief Complaint  Kelly BP    History of Present Illness   Stopped NSAID due to elevated BP, as instructed.  No palpitations, light-headiness, chest discomfort, shortness of air, headache, blurred vision or pedal edema        Objective     Vital Signs:   /90 (BP Location: Left arm, Patient Position: Sitting, Cuff Size: Adult)   Pulse 88   Temp 96.9 °F (36.1 °C) (Temporal)   Resp 20   Ht 165.1 cm (65\")   Wt 73.9 kg (163 lb)   SpO2 96%   BMI 27.12 kg/m²   Current Outpatient Medications on File Prior to Visit   Medication Sig Dispense Refill   • atorvastatin (LIPITOR) 10 MG tablet Take 1 tablet by mouth Daily. 90 tablet 0   • cetirizine (zyrTEC) 10 MG tablet Take 1 tablet by mouth Daily. 90 tablet 3   • cholecalciferol (VITAMIN D3) 25 MCG (1000 UT) tablet Take 1,000 Units by mouth Daily.     • fluticasone (FLONASE) 50 MCG/ACT nasal spray 1 spray into the nostril(s) as directed by provider Daily As Needed for Allergies. 16 g 0   • meloxicam (MOBIC) 15 MG tablet Take 15 mg by mouth Daily As Needed for Mild Pain.     • montelukast (Singulair) 10 MG tablet Take 1 tablet by mouth Every Night. 90 tablet 3   • omeprazole (priLOSEC) 40 MG capsule Take 1 capsule by mouth Daily. 90 capsule 0   • tacrolimus (PROTOPIC) 0.1 % ointment Apply 1 application topically to the appropriate area as directed 2 (Two) Times a Day As Needed (Inflammatory dermatitis). 60 g 0   • valsartan-hydrochlorothiazide (DIOVAN-HCT) 160-12.5 MG per tablet Take 1 tablet by mouth Daily. 90 tablet 0     No current facility-administered medications on file prior to visit.        Past Medical History:   Diagnosis Date   • Vitamin D deficiency    • Wears glasses       Past Surgical History:   Procedure Laterality Date   • BLADDER SUSPENSION     • BREAST LUMPECTOMY  1981    Benign breast nodule   • COLONOSCOPY N/A 08/23/2022    Procedure: COLONOSCOPY WITH POLYPECTOMY X 3;  Surgeon: Shireen Gruber MD;  Location: [facility] ENDOSCOPY;  Service: " Gastroenterology;  Laterality: N/A;  POLYPS, DIVERTICULOSIS, HEMORRHOIDS, PROLAPSE   • COLONOSCOPY W/ BIOPSIES  06/2011    Benign Polyps   • COLONOSCOPY W/ BIOPSIES  02/2018    Benign Polyps; Repeat Colonoscopy 2023   • ENDOSCOPY  06/2011   • ENDOSCOPY  02/2018    Duodenal ulcer   • TOTAL ABDOMINAL HYSTERECTOMY WITH SALPINGO OOPHORECTOMY Bilateral 1982    ENDOMETRIOSIS   • TUMOR EXCISION  09/2022    On top lip- benign      Family History   Problem Relation Age of Onset   • Cancer Mother    • Hypertension Sister    • Diabetes Sister    • Diabetes Sister    • Kidney failure Sister         Required dialysis   • Fibromyalgia Sister    • Hypertension Brother    • No Known Problems Brother    • No Known Problems Son    • No Known Problems Son    • No Known Problems Son    • No Known Problems Son       Social History     Socioeconomic History   • Marital status:    Tobacco Use   • Smoking status: Never   • Smokeless tobacco: Never   Vaping Use   • Vaping Use: Never used   Substance and Sexual Activity   • Alcohol use: Never   • Drug use: Not Currently     Comment: Tried Marijuana - NO current illegal/recreational at this time.   • Sexual activity: Defer         Office Visit on 12/15/2022   Component Date Value Ref Range Status   • Glucose 12/15/2022 82  65 - 99 mg/dL Final   • BUN 12/15/2022 14  8 - 23 mg/dL Final   • Creatinine 12/15/2022 0.76  0.57 - 1.00 mg/dL Final   • Sodium 12/15/2022 143  136 - 145 mmol/L Final   • Potassium 12/15/2022 4.1  3.5 - 5.2 mmol/L Final   • Chloride 12/15/2022 106  98 - 107 mmol/L Final   • CO2 12/15/2022 26.2  22.0 - 29.0 mmol/L Final   • Calcium 12/15/2022 9.8  8.6 - 10.5 mg/dL Final   • Total Protein 12/15/2022 7.3  6.0 - 8.5 g/dL Final   • Albumin 12/15/2022 4.50  3.50 - 5.20 g/dL Final   • ALT (SGPT) 12/15/2022 19  1 - 33 U/L Final   • AST (SGOT) 12/15/2022 21  1 - 32 U/L Final   • Alkaline Phosphatase 12/15/2022 66  39 - 117 U/L Final   • Total Bilirubin 12/15/2022 0.4  0.0 -  1.2 mg/dL Final   • Globulin 12/15/2022 2.8  gm/dL Final   • A/G Ratio 12/15/2022 1.6  g/dL Final   • BUN/Creatinine Ratio 12/15/2022 18.4  7.0 - 25.0 Final   • Anion Gap 12/15/2022 10.8  5.0 - 15.0 mmol/L Final   • eGFR 12/15/2022 86.0  >60.0 mL/min/1.73 Final    National Kidney Foundation and American Society of Nephrology (ASN) Task Force recommended calculation based on the Chronic Kidney Disease Epidemiology Collaboration (CKD-EPI) equation refit without adjustment for race.   • Total Cholesterol 12/15/2022 186  0 - 200 mg/dL Final   • Triglycerides 12/15/2022 135  0 - 150 mg/dL Final   • HDL Cholesterol 12/15/2022 54  40 - 60 mg/dL Final   • LDL Cholesterol  12/15/2022 108 (H)  0 - 100 mg/dL Final   • VLDL Cholesterol 12/15/2022 24  5 - 40 mg/dL Final   • LDL/HDL Ratio 12/15/2022 1.94   Final   • Vitamin B-12 12/15/2022 328  211 - 946 pg/mL Final   • TSH 12/15/2022 1.200  0.270 - 4.200 uIU/mL Final         Physical Exam   GENERAL:   No acute distress, pleasant, excellent energy level.  HEART:    Regular rate and rhythm without murmur.  LUNGS:    Clear to auscultation, excellent air movement throughout.   EXTREMITIES:  No pedal edema.  INTEGUMENT:  Warm, dry, pink.  No exanthema.    Result Review  Data Reviewed:{ Labs  Result Review  Imaging  Med Tab  Media :23}                     Assessment and Plan {CC Problem List  Visit Diagnosis  ROS  Review (Popup)  Health Maintenance  Quality  BestPractice  Medications  SmartSets  SnapShot Encounters  Media :23}   Diagnoses and all orders for this visit:    1. Benign hypertension (Primary)  Assessment & Plan:  Hypertension improved post D/C NSAID but not at goal.  Low dose HCTZ having little to nil affect.  Increase to 25 mg with Valsartan    Orders:  -     hydroCHLOROthiazide (HYDRODIURIL) 12.5 MG tablet; Take 1 tablet by mouth Daily.  Dispense: 90 tablet; Refill: 0        Follow Up {Instructions Charge Capture  Follow-up Communications :23}      Patient was given instructions and counseling regarding her condition or for health maintenance advice. Please see specific information pulled into the AVS (placed there by myself) if appropriate.    Return in about 3 months (around 3/21/2023) for Cor.         Suzie Ching MD

## 2023-03-22 ENCOUNTER — OFFICE VISIT (OUTPATIENT)
Dept: FAMILY MEDICINE CLINIC | Age: 68
End: 2023-03-22
Payer: MEDICARE

## 2023-03-22 VITALS
BODY MASS INDEX: 27.52 KG/M2 | WEIGHT: 165.2 LBS | TEMPERATURE: 97.8 F | HEIGHT: 65 IN | SYSTOLIC BLOOD PRESSURE: 110 MMHG | RESPIRATION RATE: 16 BRPM | OXYGEN SATURATION: 96 % | DIASTOLIC BLOOD PRESSURE: 78 MMHG | HEART RATE: 74 BPM

## 2023-03-22 DIAGNOSIS — M54.50 CHRONIC BILATERAL LOW BACK PAIN WITHOUT SCIATICA: ICD-10-CM

## 2023-03-22 DIAGNOSIS — I10 BENIGN HYPERTENSION: Primary | ICD-10-CM

## 2023-03-22 DIAGNOSIS — E78.2 MIXED HYPERLIPIDEMIA: ICD-10-CM

## 2023-03-22 DIAGNOSIS — K21.9 GASTROESOPHAGEAL REFLUX DISEASE WITHOUT ESOPHAGITIS: ICD-10-CM

## 2023-03-22 DIAGNOSIS — G89.29 CHRONIC BILATERAL LOW BACK PAIN WITHOUT SCIATICA: ICD-10-CM

## 2023-03-22 DIAGNOSIS — R07.9 CHEST PAIN, UNSPECIFIED TYPE: ICD-10-CM

## 2023-03-22 DIAGNOSIS — J30.1 NON-SEASONAL ALLERGIC RHINITIS DUE TO POLLEN: ICD-10-CM

## 2023-03-22 PROCEDURE — T1015 CLINIC SERVICE: HCPCS | Performed by: FAMILY MEDICINE

## 2023-03-22 PROCEDURE — 99215 OFFICE O/P EST HI 40 MIN: CPT | Performed by: FAMILY MEDICINE

## 2023-03-22 RX ORDER — CETIRIZINE HYDROCHLORIDE 10 MG/1
10 TABLET ORAL DAILY
Qty: 90 TABLET | Refills: 3 | Status: SHIPPED | OUTPATIENT
Start: 2023-03-22

## 2023-03-22 RX ORDER — HYDROCHLOROTHIAZIDE 12.5 MG/1
12.5 TABLET ORAL DAILY
Qty: 90 TABLET | Refills: 0 | Status: SHIPPED | OUTPATIENT
Start: 2023-03-22

## 2023-03-22 RX ORDER — ATORVASTATIN CALCIUM 10 MG/1
10 TABLET, FILM COATED ORAL DAILY
Qty: 90 TABLET | Refills: 3 | Status: SHIPPED | OUTPATIENT
Start: 2023-03-22

## 2023-03-22 RX ORDER — OMEPRAZOLE 40 MG/1
40 CAPSULE, DELAYED RELEASE ORAL DAILY
Qty: 90 CAPSULE | Refills: 0 | Status: SHIPPED | OUTPATIENT
Start: 2023-03-22

## 2023-03-22 RX ORDER — MONTELUKAST SODIUM 10 MG/1
10 TABLET ORAL NIGHTLY
Qty: 90 TABLET | Refills: 3 | Status: SHIPPED | OUTPATIENT
Start: 2023-03-22

## 2023-03-22 NOTE — PROGRESS NOTES
Meenakshi Pichardo presents to [facility] PRIMARY CARE      Chief Complaint  HTN    HPI     Respiratory:  SOA:  no. Exertional dyspnea: yes, upon walking 20 min.  Dyspnea at rest: no.  Flights of stairs climbable: 3.  Cardiology:  Chest pain: upon exertion.  Dizziness: no. Easily fatigued: no.  Pedal edema: no.  Light-headiness: no.  Symptoms of claudication: no.  Orthopnea: no.  Palpitations: no. Tachycardia: no.  Ophthalmology:  Last eye exam date: 2022 . Vision changes: none.  Gastroenterology:  Heartburn: no.  Nausea: no.    ROS: Low back pain x 1 year.  Tightness.  Also pain and tightness in the posterior thighs.    Current Outpatient Medications:   •  atorvastatin (LIPITOR) 10 MG tablet, Take 1 tablet by mouth Daily., Disp: 90 tablet, Rfl: 3  •  cetirizine (zyrTEC) 10 MG tablet, Take 1 tablet by mouth Daily., Disp: 90 tablet, Rfl: 3  •  cholecalciferol (VITAMIN D3) 25 MCG (1000 UT) tablet, Take 1 tablet by mouth Daily., Disp: , Rfl:   •  fluticasone (FLONASE) 50 MCG/ACT nasal spray, 1 spray into the nostril(s) as directed by provider Daily As Needed for Allergies., Disp: 16 g, Rfl: 0  •  hydroCHLOROthiazide (HYDRODIURIL) 12.5 MG tablet, Take 1 tablet by mouth Daily., Disp: 90 tablet, Rfl: 0  •  montelukast (Singulair) 10 MG tablet, Take 1 tablet by mouth Every Night., Disp: 90 tablet, Rfl: 3  •  omeprazole (priLOSEC) 40 MG capsule, Take 1 capsule by mouth Daily., Disp: 90 capsule, Rfl: 0  •  Probiotic Product (PROBIOTIC DAILY PO), Take  by mouth Daily., Disp: , Rfl:   •  tacrolimus (PROTOPIC) 0.1 % ointment, Apply 1 application topically to the appropriate area as directed 2 (Two) Times a Day As Needed (Inflammatory dermatitis)., Disp: 60 g, Rfl: 0  •  valsartan-hydrochlorothiazide (DIOVAN-HCT) 160-12.5 MG per tablet, Take 1 tablet by mouth Daily., Disp: 90 tablet, Rfl: 0  •  meloxicam (MOBIC) 15 MG tablet, Take 15 mg by mouth Daily As Needed for Mild Pain. (Patient not taking: Reported on  3/22/2023), Disp: , Rfl:      Allergies   Allergen Reactions   • Percocet [Oxycodone-Acetaminophen] Nausea And Vomiting and Other (See Comments)     Muscle Weakness    • Contrast Dye (Echo Or Unknown Ct/Mr) Hives and Swelling        Past Medical History:   Diagnosis Date   • Vitamin D deficiency    • Wears glasses        Past Surgical History:   Procedure Laterality Date   • BLADDER SUSPENSION     • BREAST LUMPECTOMY  1981    Benign breast nodule   • COLONOSCOPY N/A 08/23/2022    Procedure: COLONOSCOPY WITH POLYPECTOMY X 3;  Surgeon: Shireen Gruber MD;  Location: [facility] ENDOSCOPY;  Service: Gastroenterology;  Laterality: N/A;  POLYPS, DIVERTICULOSIS, HEMORRHOIDS, PROLAPSE   • COLONOSCOPY W/ BIOPSIES  06/2011    Benign Polyps   • COLONOSCOPY W/ BIOPSIES  02/2018    Benign Polyps; Repeat Colonoscopy 2023   • ENDOSCOPY  06/2011   • ENDOSCOPY  02/2018    Duodenal ulcer   • TOTAL ABDOMINAL HYSTERECTOMY WITH SALPINGO OOPHORECTOMY Bilateral 1982    ENDOMETRIOSIS   • TUMOR EXCISION  09/2022    On top lip- benign        Family History   Problem Relation Age of Onset   • Cancer Mother    • Hypertension Sister    • Diabetes Sister    • Diabetes Sister    • Kidney failure Sister         Required dialysis   • Fibromyalgia Sister    • Hypertension Brother    • No Known Problems Brother    • No Known Problems Son    • No Known Problems Son    • No Known Problems Son    • No Known Problems Son         Social History     Socioeconomic History   • Marital status:    Tobacco Use   • Smoking status: Never   • Smokeless tobacco: Never   Vaping Use   • Vaping Use: Never used   Substance and Sexual Activity   • Alcohol use: Never   • Drug use: Not Currently     Comment: Tried Marijuana - NO current illegal/recreational at this time.   • Sexual activity: Defer            Objective   Vital Signs:  /78 (BP Location: Left arm, Patient Position: Sitting, Cuff Size: Adult)   Pulse 74   Temp 97.8 °F (36.6 °C) (Temporal)    "Resp 16   Ht 165.1 cm (65\")   Wt 74.9 kg (165 lb 3.2 oz)   SpO2 96%   BMI 27.49 kg/m²   Estimated body mass index is 27.49 kg/m² as calculated from the following:    Height as of this encounter: 165.1 cm (65\").    Weight as of this encounter: 74.9 kg (165 lb 3.2 oz).        Physical Exam  General:  No acute distress, cheerful, well kept, interactive, good energy level.  Cardiac:  Heart regular rate and rhythm without murmur.  Respiratory:  Lungs clear to auscultation bilaterally and excellent air movement throughout.  Pulses:                            Right          Left         Bruit  Carotid                2+              2+           No  Radial                 2+              2+  Abd Ao               Not bounding  Femoral             Not palpable bilaterally  DP                      2+              2+  PT                      2+              2+  Venous:  Absent JVD. Absent lower extremity varicosities.  Dermatology:  Non-dystrophic toe nails. No Integument changes of the toes, feet, ankles, shins.  Lower Extremities:  No leg edema.  Toes are warm and pink  Neuro:  Sensory intact.  Negative straight leg test bilaterally.  Feels pulling in hamstring muscles.  No dorsal spine tenderness.  Mild tenderness and pulling across the para lumbar muscles.  Normal curvature of the spine.  Normal gait and normal transition of positions    Result Review :                    Assessment and Plan   Diagnoses and all orders for this visit:    1. Benign hypertension (Primary)  Assessment & Plan:  MDM: Stable. Continue 25 mg HCTZ with Diovan -HCTZ 160-12.5.      Orders:  -     Stress Test With Myocardial Perfusion - One Day; Future  -     hydroCHLOROthiazide (HYDRODIURIL) 12.5 MG tablet; Take 1 tablet by mouth Daily.  Dispense: 90 tablet; Refill: 0    2. Chest pain, unspecified type  Comments:  MDM: Patient reporting increasing exertional SOA with chest discomfort.  Eval for CAD  Orders:  -     Stress Test With Myocardial " Perfusion - One Day; Future    3. Mixed hyperlipidemia  Assessment & Plan:  MDM: Stable. Continue present medications without changes.      Orders:  -     atorvastatin (LIPITOR) 10 MG tablet; Take 1 tablet by mouth Daily.  Dispense: 90 tablet; Refill: 3    4. Chronic bilateral low back pain without sciatica  -     XR Spine Lumbar 2 or 3 View; Future  -     Ambulatory Referral to Physical Therapy Evaluate and treat; Heat; Phonophoresis, Moist heat; Soft Tissue Mobilizaton; Stretching, Strengthening    5. Gastroesophageal reflux disease without esophagitis  -     omeprazole (priLOSEC) 40 MG capsule; Take 1 capsule by mouth Daily.  Dispense: 90 capsule; Refill: 0    6. Non-seasonal allergic rhinitis due to pollen  -     cetirizine (zyrTEC) 10 MG tablet; Take 1 tablet by mouth Daily.  Dispense: 90 tablet; Refill: 3  -     montelukast (Singulair) 10 MG tablet; Take 1 tablet by mouth Every Night.  Dispense: 90 tablet; Refill: 3    Time spent: 43 min in examination, addressing multiple unrelated issues, medication and chart review       Follow Up   Return in about 3 months (around 6/22/2023) for Cor.    Patient was given instructions and counseling regarding her condition or for health maintenance advice. Please see specific information pulled into the AVS if appropriate.      Suzie Ching MD

## 2023-03-28 ENCOUNTER — TELEPHONE (OUTPATIENT)
Dept: FAMILY MEDICINE CLINIC | Age: 68
End: 2023-03-28
Payer: MEDICARE

## 2023-03-28 NOTE — TELEPHONE ENCOUNTER
Patient called in from the hub. She wanted to know where her spine Xray order was sent to. After reviewing her chart, the order was placed as external but patient did not mention where she wanted to have this completed at during her office visit. I asked patient if she has a preference, she stated no. I recommended [facility] so that we can receive results quickly. Patient agreed and stated she will call the hospital to have this scheduled. I have changed the imaging order type from external to Hospital performed.

## 2023-04-04 ENCOUNTER — HOSPITAL ENCOUNTER (OUTPATIENT)
Dept: GENERAL RADIOLOGY | Facility: HOSPITAL | Age: 68
Discharge: HOME | End: 2023-04-04
Admitting: FAMILY MEDICINE
Payer: MEDICARE

## 2023-04-04 ENCOUNTER — HOSPITAL ENCOUNTER (OUTPATIENT)
Dept: NUCLEAR MEDICINE | Facility: HOSPITAL | Age: 68
Discharge: HOME | End: 2023-04-04
Payer: MEDICARE

## 2023-04-04 DIAGNOSIS — G89.29 CHRONIC BILATERAL LOW BACK PAIN WITHOUT SCIATICA: ICD-10-CM

## 2023-04-04 DIAGNOSIS — M54.50 CHRONIC BILATERAL LOW BACK PAIN WITHOUT SCIATICA: ICD-10-CM

## 2023-04-04 DIAGNOSIS — R07.9 CHEST PAIN, UNSPECIFIED TYPE: ICD-10-CM

## 2023-04-04 DIAGNOSIS — I10 BENIGN HYPERTENSION: ICD-10-CM

## 2023-04-04 LAB
BH CV REST NUCLEAR ISOTOPE DOSE: 11.4 MCI
BH CV STRESS BP STAGE 1: NORMAL
BH CV STRESS BP STAGE 2: NORMAL
BH CV STRESS COMMENTS STAGE 1: NORMAL
BH CV STRESS COMMENTS STAGE 2: NORMAL
BH CV STRESS DOSE REGADENOSON STAGE 1: 0.4
BH CV STRESS DURATION MIN STAGE 1: 0
BH CV STRESS DURATION MIN STAGE 2: 4
BH CV STRESS DURATION SEC STAGE 1: 10
BH CV STRESS DURATION SEC STAGE 2: 0
BH CV STRESS HR STAGE 1: 63
BH CV STRESS HR STAGE 2: 80
BH CV STRESS NUCLEAR ISOTOPE DOSE: 32.5 MCI
BH CV STRESS PROTOCOL 1: NORMAL
BH CV STRESS RECOVERY BP: NORMAL MMHG
BH CV STRESS RECOVERY HR: 80 BPM
BH CV STRESS STAGE 1: 1
BH CV STRESS STAGE 2: 2
LV EF NUC BP: 74 %
MAXIMAL PREDICTED HEART RATE: 153 BPM
PERCENT MAX PREDICTED HR: 60.78 %
STRESS BASELINE BP: NORMAL MMHG
STRESS BASELINE HR: 63 BPM
STRESS PERCENT HR: 72 %
STRESS POST PEAK BP: NORMAL MMHG
STRESS POST PEAK HR: 93 BPM
STRESS TARGET HR: 130 BPM

## 2023-04-04 PROCEDURE — A9502 TC99M TETROFOSMIN: HCPCS | Performed by: FAMILY MEDICINE

## 2023-04-04 PROCEDURE — 0 TECHNETIUM TETROFOSMIN KIT: Performed by: FAMILY MEDICINE

## 2023-04-04 PROCEDURE — 93017 CV STRESS TEST TRACING ONLY: CPT

## 2023-04-04 PROCEDURE — 78452 HT MUSCLE IMAGE SPECT MULT: CPT | Performed by: INTERNAL MEDICINE

## 2023-04-04 PROCEDURE — 72100 X-RAY EXAM L-S SPINE 2/3 VWS: CPT

## 2023-04-04 PROCEDURE — 25010000002 REGADENOSON 0.4 MG/5ML SOLUTION: Performed by: FAMILY MEDICINE

## 2023-04-04 PROCEDURE — 93018 CV STRESS TEST I&R ONLY: CPT | Performed by: INTERNAL MEDICINE

## 2023-04-04 PROCEDURE — 78452 HT MUSCLE IMAGE SPECT MULT: CPT

## 2023-04-04 RX ADMIN — TETROFOSMIN 1 DOSE: 1.38 INJECTION, POWDER, LYOPHILIZED, FOR SOLUTION INTRAVENOUS at 09:23

## 2023-04-04 RX ADMIN — TETROFOSMIN 1 DOSE: 1.38 INJECTION, POWDER, LYOPHILIZED, FOR SOLUTION INTRAVENOUS at 08:20

## 2023-04-04 RX ADMIN — REGADENOSON 0.4 MG: 0.08 INJECTION, SOLUTION INTRAVENOUS at 09:23

## 2023-09-21 ENCOUNTER — OFFICE VISIT (OUTPATIENT)
Dept: FAMILY MEDICINE CLINIC | Facility: CLINIC | Age: 68
End: 2023-09-21
Payer: MEDICARE

## 2023-09-21 VITALS
DIASTOLIC BLOOD PRESSURE: 92 MMHG | SYSTOLIC BLOOD PRESSURE: 144 MMHG | HEART RATE: 80 BPM | RESPIRATION RATE: 16 BRPM | OXYGEN SATURATION: 95 % | HEIGHT: 66 IN | WEIGHT: 164.8 LBS | BODY MASS INDEX: 26.48 KG/M2 | TEMPERATURE: 96.8 F

## 2023-09-21 DIAGNOSIS — K21.9 GASTROESOPHAGEAL REFLUX DISEASE WITHOUT ESOPHAGITIS: ICD-10-CM

## 2023-09-21 DIAGNOSIS — I10 BENIGN HYPERTENSION: Primary | ICD-10-CM

## 2023-09-21 DIAGNOSIS — J30.1 NON-SEASONAL ALLERGIC RHINITIS DUE TO POLLEN: ICD-10-CM

## 2023-09-21 DIAGNOSIS — E78.2 MIXED HYPERLIPIDEMIA: ICD-10-CM

## 2023-09-21 PROCEDURE — 99214 OFFICE O/P EST MOD 30 MIN: CPT | Performed by: FAMILY MEDICINE

## 2023-09-21 PROCEDURE — 3077F SYST BP >= 140 MM HG: CPT | Performed by: FAMILY MEDICINE

## 2023-09-21 PROCEDURE — 3080F DIAST BP >= 90 MM HG: CPT | Performed by: FAMILY MEDICINE

## 2023-09-21 RX ORDER — AMLODIPINE BESYLATE 5 MG/1
5 TABLET ORAL DAILY
Qty: 30 TABLET | Refills: 0 | Status: SHIPPED | OUTPATIENT
Start: 2023-09-21

## 2023-09-21 RX ORDER — FLUTICASONE PROPIONATE 50 MCG
1 SPRAY, SUSPENSION (ML) NASAL DAILY PRN
Qty: 16 G | Refills: 0 | Status: SHIPPED | OUTPATIENT
Start: 2023-09-21

## 2023-09-21 RX ORDER — OMEPRAZOLE 40 MG/1
40 CAPSULE, DELAYED RELEASE ORAL DAILY
Qty: 90 CAPSULE | Refills: 0 | Status: SHIPPED | OUTPATIENT
Start: 2023-09-21

## 2023-09-21 RX ORDER — VALSARTAN AND HYDROCHLOROTHIAZIDE 160; 25 MG/1; MG/1
1 TABLET ORAL DAILY
Qty: 90 TABLET | Refills: 0 | Status: SHIPPED | OUTPATIENT
Start: 2023-09-21

## 2023-09-21 NOTE — PROGRESS NOTES
Meenakshi Pichardo presents to [facility] PRIMARY [facility]      Chief Complaint  Cor    HPI     Respiratory:  SOA:  no. Exertional dyspnea: no.  Dyspnea at rest: no.  Flights of stairs climbable: 3.  Cardiology:  Chest pain: no.  Dizziness: no. Easily fatigued: no.  Pedal edema: no.  Light-headiness: no.  Symptoms of claudication: no.  Orthopnea: no.  Palpitations: no. Tachycardia: no.  Ophthalmology:  Last eye exam date: 2022 . Vision changes: none.  Gastroenterology:  Heartburn: no.  Nausea: no.          Current Outpatient Medications:     atorvastatin (LIPITOR) 10 MG tablet, Take 1 tablet by mouth Daily., Disp: 90 tablet, Rfl: 3    cetirizine (zyrTEC) 10 MG tablet, Take 1 tablet by mouth Daily., Disp: 90 tablet, Rfl: 3    cholecalciferol (VITAMIN D3) 25 MCG (1000 UT) tablet, Take 1 tablet by mouth Daily., Disp: , Rfl:     fluticasone (FLONASE) 50 MCG/ACT nasal spray, 1 spray into the nostril(s) as directed by provider Daily As Needed for Allergies., Disp: 16 g, Rfl: 0    montelukast (Singulair) 10 MG tablet, Take 1 tablet by mouth Every Night., Disp: 90 tablet, Rfl: 3    omeprazole (priLOSEC) 40 MG capsule, Take 1 capsule by mouth Daily., Disp: 90 capsule, Rfl: 0    Probiotic Product (PROBIOTIC DAILY PO), Take  by mouth Daily., Disp: , Rfl:     tacrolimus (PROTOPIC) 0.1 % ointment, Apply 1 application topically to the appropriate area as directed 2 (Two) Times a Day As Needed (Inflammatory dermatitis)., Disp: 60 g, Rfl: 0    valsartan-hydrochlorothiazide (Diovan HCT) 160-25 MG per tablet, Take 1 tablet by mouth Daily., Disp: 90 tablet, Rfl: 0    amLODIPine (NORVASC) 5 MG tablet, Take 1 tablet by mouth Daily., Disp: 30 tablet, Rfl: 0     Allergies   Allergen Reactions    Percocet [Oxycodone-Acetaminophen] Nausea And Vomiting and Other (See Comments)     Muscle Weakness     Contrast Dye (Echo Or Unknown Ct/Mr) Hives and Swelling        Past Medical History:   Diagnosis Date    Vitamin D deficiency      "Wears glasses        Past Surgical History:   Procedure Laterality Date    BLADDER SUSPENSION      BREAST LUMPECTOMY  1981    Benign breast nodule    COLONOSCOPY N/A 08/23/2022    Procedure: COLONOSCOPY WITH POLYPECTOMY X 3;  Surgeon: Shireen Gruber MD;  Location: [facility] ENDOSCOPY;  Service: Gastroenterology;  Laterality: N/A;  POLYPS, DIVERTICULOSIS, HEMORRHOIDS, PROLAPSE    COLONOSCOPY W/ BIOPSIES  06/2011    Benign Polyps    COLONOSCOPY W/ BIOPSIES  02/2018    Benign Polyps; Repeat Colonoscopy 2023    ENDOSCOPY  06/2011    ENDOSCOPY  02/2018    Duodenal ulcer    TOTAL ABDOMINAL HYSTERECTOMY WITH SALPINGO OOPHORECTOMY Bilateral 1982    ENDOMETRIOSIS    TUMOR EXCISION  09/2022    On top lip- benign        Family History   Problem Relation Age of Onset    Cancer Mother     Hypertension Sister     Diabetes Sister     Diabetes Sister     Kidney failure Sister         Required dialysis    Fibromyalgia Sister     Hypertension Brother     No Known Problems Brother     No Known Problems Son     No Known Problems Son     No Known Problems Son     No Known Problems Son         Social History     Socioeconomic History    Marital status:    Tobacco Use    Smoking status: Never    Smokeless tobacco: Never   Vaping Use    Vaping Use: Never used   Substance and Sexual Activity    Alcohol use: Never    Drug use: Not Currently     Comment: Tried Marijuana - NO current illegal/recreational at this time.    Sexual activity: Defer            Objective   Vital Signs:  /92 (BP Location: Left arm, Patient Position: Sitting, Cuff Size: Adult)   Pulse 80   Temp 96.8 °F (36 °C) (Infrared)   Resp 16   Ht 167.6 cm (66\")   Wt 74.8 kg (164 lb 12.8 oz)   SpO2 95%   BMI 26.60 kg/m²   Estimated body mass index is 26.6 kg/m² as calculated from the following:    Height as of this encounter: 167.6 cm (66\").    Weight as of this encounter: 74.8 kg (164 lb 12.8 oz).    Home arm BP cuff 163/94.  In office BP " 156/92    Physical Exam  General:  No acute distress, cheerful, well kept, interactive, good energy level.  Cardiac:  Heart regular rate and rhythm without murmur.  Respiratory:  Lungs clear to auscultation bilaterally and excellent air movement throughout.  Pulses:                            Right          Left         Bruit  Carotid                2+              2+           No  Radial                 2+              2+  Abd Ao               Not bounding  Femoral             Not palpable bilaterally  DP                      2+              2+  PT                      2+              2+  Venous:  Absent JVD. Absent lower extremity varicosities.  Dermatology:  Non-dystrophic toe nails. No Integument changes of the toes, feet, ankles, shins.  Lower Extremities:  No leg edema.  Toes are warm and pink    Result Review :                    Assessment and Plan   Diagnoses and all orders for this visit:    1. Benign hypertension (Primary)  Assessment & Plan:  MDM: Systolic BP not controlled.  Add amlodipine.  Patient to call with readings in 3 wks    Orders:  -     valsartan-hydrochlorothiazide (Diovan HCT) 160-25 MG per tablet; Take 1 tablet by mouth Daily.  Dispense: 90 tablet; Refill: 0  -     amLODIPine (NORVASC) 5 MG tablet; Take 1 tablet by mouth Daily.  Dispense: 30 tablet; Refill: 0    2. Gastroesophageal reflux disease without esophagitis  Assessment & Plan:  MDM: Controlled with PPI.  Monitor vit B12 level    Orders:  -     omeprazole (priLOSEC) 40 MG capsule; Take 1 capsule by mouth Daily.  Dispense: 90 capsule; Refill: 0    3. Non-seasonal allergic rhinitis due to pollen  Assessment & Plan:  MDM: Stable. Continue present medications without changes.      Orders:  -     fluticasone (FLONASE) 50 MCG/ACT nasal spray; 1 spray into the nostril(s) as directed by provider Daily As Needed for Allergies.  Dispense: 16 g; Refill: 0    4. Mixed hyperlipidemia  Assessment & Plan:  MDM: Stable. Continue present  medications without changes.                 Follow Up   Return in about 3 months (around 12/21/2023) for Cor.    Patient was given instructions and counseling regarding her condition or for health maintenance advice. Please see specific information pulled into the AVS if appropriate.     Suzie Ching MD

## 2023-11-30 ENCOUNTER — TELEPHONE (OUTPATIENT)
Dept: FAMILY MEDICINE CLINIC | Facility: CLINIC | Age: 68
End: 2023-11-30
Payer: MEDICARE

## 2023-11-30 DIAGNOSIS — Z12.31 ENCOUNTER FOR SCREENING MAMMOGRAM FOR MALIGNANT NEOPLASM OF BREAST: Primary | ICD-10-CM

## 2023-11-30 NOTE — TELEPHONE ENCOUNTER
Caller: Meenakshi Pichardo    Relationship: Self    Best call back number: [phone]     What orders are you requesting (i.e. lab or imaging): MAMMOGRAM    In what timeframe would the patient need to come in: 12/10/23    Where will you receive your lab/imaging services: [facility]    Additional notes:

## 2023-12-06 ENCOUNTER — TELEPHONE (OUTPATIENT)
Dept: FAMILY MEDICINE CLINIC | Facility: CLINIC | Age: 68
End: 2023-12-06

## 2023-12-06 NOTE — TELEPHONE ENCOUNTER
Caller: Meenakshi Pichardo    Relationship: Self    Best call back number: [phone]     Who are you requesting to speak with (clinical staff, provider,  specific staff member): CLINICAL    What was the call regarding: PATIENT IS REQUESTING AN UPDATE ON HER MAMMOGRAM ORDER. PATIENT WANTED THIS FAXED TO [facility]    PLEASE ADVISE

## 2023-12-06 NOTE — TELEPHONE ENCOUNTER
HUB IS INSTRUCTED TO RELAY BELOW MESSAGE     VOICEMAIL NOT SET UP. IF PT CALLS BACK LET HER KNOW THAT ORDER IS FAXED TO [facility] AND SHE WOULD HAVE TO CALL THEM AND SCHEDULE. THANK YOU

## 2023-12-18 DIAGNOSIS — K21.9 GASTROESOPHAGEAL REFLUX DISEASE WITHOUT ESOPHAGITIS: ICD-10-CM

## 2023-12-18 NOTE — TELEPHONE ENCOUNTER
V     Caller: Meenakshi Pichardo    Relationship: Self    Best call back number:     [phone]       Requested Prescriptions:   Requested Prescriptions     Pending Prescriptions Disp Refills    omeprazole (priLOSEC) 40 MG capsule 90 capsule 0     Sig: Take 1 capsule by mouth Daily.        Pharmacy where request should be sent: [facility] [address] [place], [place] [address] [facility] [phone]  - [phone] FX     Last office visit with prescribing clinician: 9/21/2023   Last telemedicine visit with prescribing clinician: Visit date not found   Next office visit with prescribing clinician: Visit date not found     Additional details provided by patient:     Does the patient have less than a 3 day supply:  [] Yes  [x] No    Would you like a call back once the refill request has been completed: [] Yes [] No    If the office needs to give you a call back, can they leave a voicemail: [] Yes [] No    Earl Baer   12/18/23 14:33 EST

## 2023-12-20 RX ORDER — OMEPRAZOLE 40 MG/1
40 CAPSULE, DELAYED RELEASE ORAL DAILY
Qty: 30 CAPSULE | Refills: 0 | Status: SHIPPED | OUTPATIENT
Start: 2023-12-20

## 2023-12-21 DIAGNOSIS — Z12.31 ENCOUNTER FOR SCREENING MAMMOGRAM FOR MALIGNANT NEOPLASM OF BREAST: ICD-10-CM

## 2024-02-07 ENCOUNTER — OFFICE VISIT (OUTPATIENT)
Dept: FAMILY MEDICINE CLINIC | Facility: CLINIC | Age: 69
End: 2024-02-07
Payer: MEDICARE

## 2024-02-07 VITALS
WEIGHT: 167.4 LBS | TEMPERATURE: 96.9 F | HEART RATE: 80 BPM | BODY MASS INDEX: 26.9 KG/M2 | OXYGEN SATURATION: 97 % | SYSTOLIC BLOOD PRESSURE: 138 MMHG | HEIGHT: 66 IN | DIASTOLIC BLOOD PRESSURE: 92 MMHG | RESPIRATION RATE: 16 BRPM

## 2024-02-07 DIAGNOSIS — I10 BENIGN HYPERTENSION: ICD-10-CM

## 2024-02-07 DIAGNOSIS — K21.9 GASTROESOPHAGEAL REFLUX DISEASE WITHOUT ESOPHAGITIS: ICD-10-CM

## 2024-02-07 DIAGNOSIS — J02.9 SORE THROAT: ICD-10-CM

## 2024-02-07 DIAGNOSIS — B34.9 VIREMIA: Primary | ICD-10-CM

## 2024-02-07 DIAGNOSIS — J30.1 NON-SEASONAL ALLERGIC RHINITIS DUE TO POLLEN: ICD-10-CM

## 2024-02-07 DIAGNOSIS — R05.1 ACUTE COUGH: ICD-10-CM

## 2024-02-07 LAB
EXPIRATION DATE: NORMAL
EXPIRATION DATE: NORMAL
FLUAV AG UPPER RESP QL IA.RAPID: NOT DETECTED
FLUBV AG UPPER RESP QL IA.RAPID: NOT DETECTED
INTERNAL CONTROL: NORMAL
INTERNAL CONTROL: NORMAL
Lab: NORMAL
Lab: NORMAL
S PYO AG THROAT QL: NEGATIVE
SARS-COV-2 AG UPPER RESP QL IA.RAPID: NOT DETECTED

## 2024-02-07 PROCEDURE — 3075F SYST BP GE 130 - 139MM HG: CPT | Performed by: FAMILY MEDICINE

## 2024-02-07 PROCEDURE — 99214 OFFICE O/P EST MOD 30 MIN: CPT | Performed by: FAMILY MEDICINE

## 2024-02-07 PROCEDURE — 87880 STREP A ASSAY W/OPTIC: CPT | Performed by: FAMILY MEDICINE

## 2024-02-07 PROCEDURE — 3080F DIAST BP >= 90 MM HG: CPT | Performed by: FAMILY MEDICINE

## 2024-02-07 PROCEDURE — 1159F MED LIST DOCD IN RCRD: CPT | Performed by: FAMILY MEDICINE

## 2024-02-07 PROCEDURE — 87428 SARSCOV & INF VIR A&B AG IA: CPT | Performed by: FAMILY MEDICINE

## 2024-02-07 PROCEDURE — 1160F RVW MEDS BY RX/DR IN RCRD: CPT | Performed by: FAMILY MEDICINE

## 2024-02-07 RX ORDER — CODEINE PHOSPHATE/GUAIFENESIN 10-100MG/5
5 LIQUID (ML) ORAL NIGHTLY PRN
Qty: 180 ML | Refills: 0 | Status: SHIPPED | OUTPATIENT
Start: 2024-02-07

## 2024-02-07 RX ORDER — OSELTAMIVIR PHOSPHATE 75 MG/1
75 CAPSULE ORAL 2 TIMES DAILY
Qty: 10 CAPSULE | Refills: 0 | Status: SHIPPED | OUTPATIENT
Start: 2024-02-07

## 2024-02-07 RX ORDER — BENZONATATE 200 MG/1
200 CAPSULE ORAL 2 TIMES DAILY PRN
Qty: 30 CAPSULE | Refills: 0 | Status: SHIPPED | OUTPATIENT
Start: 2024-02-07

## 2024-02-07 RX ORDER — OMEPRAZOLE 40 MG/1
40 CAPSULE, DELAYED RELEASE ORAL DAILY
Qty: 30 CAPSULE | Refills: 0 | Status: SHIPPED | OUTPATIENT
Start: 2024-02-07

## 2024-02-07 RX ORDER — VALSARTAN AND HYDROCHLOROTHIAZIDE 160; 25 MG/1; MG/1
1 TABLET ORAL DAILY
Qty: 90 TABLET | Refills: 0 | Status: SHIPPED | OUTPATIENT
Start: 2024-02-07

## 2024-02-07 NOTE — PROGRESS NOTES
"Chief Complaint  Cough (X10 days), Sore Throat, Earache (Left ear on 02.05.2024), Fatigue, Nausea (Once (today)), and URI (Thick mucus along with congestion at times/)    History of Present Illness   Ears:    Pain: left.  Drainage: no  Nose:  Epistaxis: no. Nasal drainage: yes. Requesting referral for allergy testing. Nasal congestion:yes. Post nasal drip: yes.   Sneezing: no.  Sinusitis:  Facial pain: no.  Headache: no.   Throat:   Soreness: mild.  Dysphagia.  Lungs:  Cough: yes, interrupts sleep.  Pleuritic pain: no.  Smoker: no.  General:  Myalgias: no. Malaise: yes.   Fever: no.  Chills: no.   Gastroenterology:  Diarrhea: no. Nausea: yes. Vomiting: no      Objective     Vital Signs:   /92 (BP Location: Right arm, Patient Position: Sitting, Cuff Size: Adult)   Pulse 80   Temp 96.9 °F (36.1 °C) (Infrared)   Resp 16   Ht 167.6 cm (66\")   Wt 75.9 kg (167 lb 6.4 oz)   SpO2 97%   BMI 27.02 kg/m²   Current Outpatient Medications on File Prior to Visit   Medication Sig Dispense Refill    atorvastatin (LIPITOR) 10 MG tablet Take 1 tablet by mouth Daily. 90 tablet 3    cetirizine (zyrTEC) 10 MG tablet Take 1 tablet by mouth Daily. 90 tablet 3    cholecalciferol (VITAMIN D3) 25 MCG (1000 UT) tablet Take 1 tablet by mouth Daily.      fluticasone (FLONASE) 50 MCG/ACT nasal spray 1 spray into the nostril(s) as directed by provider Daily As Needed for Allergies. 16 g 0    montelukast (Singulair) 10 MG tablet Take 1 tablet by mouth Every Night. 90 tablet 3    Probiotic Product (PROBIOTIC DAILY PO) Take  by mouth Daily.      tacrolimus (PROTOPIC) 0.1 % ointment Apply 1 application topically to the appropriate area as directed 2 (Two) Times a Day As Needed (Inflammatory dermatitis). 60 g 0    [DISCONTINUED] omeprazole (priLOSEC) 40 MG capsule Take 1 capsule by mouth Daily. 30 capsule 0    [DISCONTINUED] valsartan-hydrochlorothiazide (Diovan HCT) 160-25 MG per tablet Take 1 tablet by mouth Daily. 90 tablet 0    " [DISCONTINUED] amLODIPine (NORVASC) 5 MG tablet Take 1 tablet by mouth Daily. 30 tablet 0     No current facility-administered medications on file prior to visit.        Past Medical History:   Diagnosis Date    Vitamin D deficiency     Wears glasses       Past Surgical History:   Procedure Laterality Date    BLADDER SUSPENSION      BREAST LUMPECTOMY  1981    Benign breast nodule    COLONOSCOPY N/A 08/23/2022    Procedure: COLONOSCOPY WITH POLYPECTOMY X 3;  Surgeon: Shireen Gruber MD;  Location: [facility] ENDOSCOPY;  Service: Gastroenterology;  Laterality: N/A;  POLYPS, DIVERTICULOSIS, HEMORRHOIDS, PROLAPSE    COLONOSCOPY W/ BIOPSIES  06/2011    Benign Polyps    COLONOSCOPY W/ BIOPSIES  02/2018    Benign Polyps; Repeat Colonoscopy 2023    ENDOSCOPY  06/2011    ENDOSCOPY  02/2018    Duodenal ulcer    TOTAL ABDOMINAL HYSTERECTOMY WITH SALPINGO OOPHORECTOMY Bilateral 1982    ENDOMETRIOSIS    TUMOR EXCISION  09/2022    On top lip- benign      Family History   Problem Relation Age of Onset    Cancer Mother     Hypertension Sister     Diabetes Sister     Diabetes Sister     Kidney failure Sister         Required dialysis    Fibromyalgia Sister     Hypertension Brother     No Known Problems Brother     No Known Problems Son     No Known Problems Son     No Known Problems Son     No Known Problems Son       Social History     Socioeconomic History    Marital status:    Tobacco Use    Smoking status: Never    Smokeless tobacco: Never   Vaping Use    Vaping Use: Never used   Substance and Sexual Activity    Alcohol use: Never    Drug use: Not Currently     Comment: Tried Marijuana - NO current illegal/recreational at this time.    Sexual activity: Defer         Office Visit on 02/07/2024   Component Date Value Ref Range Status    Rapid Strep A Screen 02/07/2024 Negative  Negative, VALID, INVALID, Not Performed Final    Internal Control 02/07/2024 Passed  Passed Final    Lot Number 02/07/2024 [id number]   Final     Expiration Date 02/07/2024 01.01.2025   Final         Physical Exam   General:  No acute distress,  appears slightly tired  Eyes:  Normal.  Ears:  Canals: normal  TM: intact, no erythema, air fluid level, bulging, dilated vessels  Nose:  Breathing comfortably through the nose. Scant clear mucous. Normal pink mucosa, 3+ edema bilaterally.  Sinuses:  Frontal: non-tender  Maxillary: non-tender  Buccal Cavity:  Moist membranes  No oral lesions  Throat:  Scant mucous draining down the posterior oropharyngeal wall. No erythema. No exudate.  No laryngitis  Lungs:  Clear to auscultation bilaterally, excellent air movement throughout  Voice strong and clear  Cor:  Heart regular rate and rhythm without murmur   Abdomen:  Bowel sounds are normal pitch and frequency x 4 quadrants  No tenderness. No palpable mass.  Lymph nodes:  No enlarged anterior or posterior cervical lymph nodes  Neck:  Supple.  No palpable mass  Integument:  Warm, dry, pink without exanthema     Result Review  Data Reviewed:{ Labs  Result Review  Imaging  Med Tab  Media :23}                     Assessment and Plan {CC Problem List  Visit Diagnosis  ROS  Review (Popup)  Health Maintenance  Quality  BestPractice  Medications  SmartSets  SnapShot Encounters  Media :23}   Diagnoses and all orders for this visit:    1. Viremia (Primary)  -     POCT SARS-CoV-2 Antigen EDIL + Flu  -     oseltamivir (Tamiflu) 75 MG capsule; Take 1 capsule by mouth 2 (Two) Times a Day.  Dispense: 10 capsule; Refill: 0    2. Sore throat  -     POCT rapid strep A    3. Benign hypertension  -     valsartan-hydrochlorothiazide (Diovan HCT) 160-25 MG per tablet; Take 1 tablet by mouth Daily.  Dispense: 90 tablet; Refill: 0    4. Gastroesophageal reflux disease without esophagitis  -     omeprazole (priLOSEC) 40 MG capsule; Take 1 capsule by mouth Daily.  Dispense: 30 capsule; Refill: 0    5. Acute cough  -     benzonatate (TESSALON) 200 MG capsule; Take 1 capsule by mouth  2 (Two) Times a Day As Needed for Cough. As needed, take 1 in the AM and 1 in the midday.  Do not take at bedtime  Dispense: 30 capsule; Refill: 0  -     guaiFENesin-codeine (GUAIFENESIN AC) 100-10 MG/5ML liquid; Take 5 mL by mouth At Night As Needed for Cough. Take 5-10 ml (1-2 tsp) at bedtime, as needed.  May repeat 4 hrs later.  Do not take during daytime  Dispense: 180 mL; Refill: 0    6. Non-seasonal allergic rhinitis due to pollen  -     Ambulatory Referral to ENT (Otolaryngology)          Follow Up {Instructions Charge Capture  Follow-up Communications :23}     Patient was given instructions and counseling regarding her condition or for health maintenance advice. Please see specific information pulled into the AVS (placed there by myself) if appropriate.    Return in about 1 month (around 3/7/2024) for Cor.    MDM   BP not at goal.  Patient ran out of Amlodipine.  No side effect from the medication.  Resume Amlodipine.  Bothered by abundant nasal drainage.  Continue allergy medications.  Refer to ENT-Allergist for further diagnostic evaluation.  Symptoms of influenza.  In office test not 100% sensitive.  Start Tamiflu to decrease severity of symptoms and decrease viral shedding.    Suzie Ching MD

## 2024-02-12 ENCOUNTER — TELEPHONE (OUTPATIENT)
Dept: FAMILY MEDICINE CLINIC | Facility: CLINIC | Age: 69
End: 2024-02-12

## 2024-02-12 NOTE — TELEPHONE ENCOUNTER
Caller: Meenakshi Pichardo    Relationship: Self    Best call back number: [phone]    What medication are you requesting: ADDITIONAL BLOOD BLOOD PRESSURE MEDICATION, MEDICATION OTHER THAN TAMIFLU    If a prescription is needed, what is your preferred pharmacy and phone number: [place] [id number]  [place]  [phone] [facility] [phone]      Additional notes:PATIENT STATED THAT DR JASSO WAS SUPPOSED TO CALL IN AN ADDITIONAL BLOOD PRESSURE MEDICATION FOR HER BUT IT IS NOT AT THE PHARMACY. PATIENT ALSO STATED SHE DOES NOT FEEL MUCH BETTER HER SORE IS STILL SORE FROM HER VISIT AND AFTER TAKING THE TAMIFLU AND IS REQUESTING SOMETHING ELSE BE CALLED IN.

## 2024-02-12 NOTE — TELEPHONE ENCOUNTER
Called and s/w patient. An appointment has been made for tomorrow (02.13.2024) per patient's request as she is not feeling any better. Patient was advised medication concern would be addressed at this appointment as PCP is not in office today. Patient voiced understanding with no further questions/concerns.

## 2024-02-13 ENCOUNTER — OFFICE VISIT (OUTPATIENT)
Dept: FAMILY MEDICINE CLINIC | Facility: CLINIC | Age: 69
End: 2024-02-13
Payer: MEDICARE

## 2024-02-13 VITALS
SYSTOLIC BLOOD PRESSURE: 115 MMHG | DIASTOLIC BLOOD PRESSURE: 64 MMHG | HEIGHT: 66 IN | TEMPERATURE: 97.3 F | OXYGEN SATURATION: 93 % | WEIGHT: 172.2 LBS | HEART RATE: 79 BPM | RESPIRATION RATE: 16 BRPM | BODY MASS INDEX: 27.68 KG/M2

## 2024-02-13 DIAGNOSIS — H69.93 DYSFUNCTION OF BOTH EUSTACHIAN TUBES: ICD-10-CM

## 2024-02-13 DIAGNOSIS — J30.1 NON-SEASONAL ALLERGIC RHINITIS DUE TO POLLEN: ICD-10-CM

## 2024-02-13 DIAGNOSIS — J02.9 ACUTE PHARYNGITIS, UNSPECIFIED ETIOLOGY: Primary | ICD-10-CM

## 2024-02-13 PROCEDURE — 3078F DIAST BP <80 MM HG: CPT | Performed by: FAMILY MEDICINE

## 2024-02-13 PROCEDURE — 99214 OFFICE O/P EST MOD 30 MIN: CPT | Performed by: FAMILY MEDICINE

## 2024-02-13 PROCEDURE — 1159F MED LIST DOCD IN RCRD: CPT | Performed by: FAMILY MEDICINE

## 2024-02-13 PROCEDURE — 3074F SYST BP LT 130 MM HG: CPT | Performed by: FAMILY MEDICINE

## 2024-02-13 PROCEDURE — 1160F RVW MEDS BY RX/DR IN RCRD: CPT | Performed by: FAMILY MEDICINE

## 2024-02-13 RX ORDER — AZELASTINE 1 MG/ML
2 SPRAY, METERED NASAL 2 TIMES DAILY
Qty: 30 ML | Refills: 1 | Status: SHIPPED | OUTPATIENT
Start: 2024-02-13

## 2024-02-13 RX ORDER — AMOXICILLIN AND CLAVULANATE POTASSIUM 875; 125 MG/1; MG/1
1 TABLET, FILM COATED ORAL 2 TIMES DAILY
Qty: 14 TABLET | Refills: 0 | Status: SHIPPED | OUTPATIENT
Start: 2024-02-13

## 2024-02-27 ENCOUNTER — TELEPHONE (OUTPATIENT)
Dept: FAMILY MEDICINE CLINIC | Facility: CLINIC | Age: 69
End: 2024-02-27
Payer: MEDICARE

## 2024-02-27 NOTE — TELEPHONE ENCOUNTER
Caller: Taylor Pichardo    Relationship: Self    Best call back number:[phone]    What orders are you requesting (i.e. lab or imaging): COLONOSCOPY     In what timeframe would the patient need to come in: AS SOON AS POSSIBLE. TAYLOR HAS BEEN PASSING MUCUS IN STOOL FOR PAST 24 HOURS    Where will you receive your lab/imaging services: WHEREVER IS RECOMMENDED

## 2024-02-27 NOTE — TELEPHONE ENCOUNTER
HUB to read description below.      LVM FOR PT TO CALL OFFICE AND RESCHEDULE APPOINTMENT THAT'S ON FOR 03-07-24. PLEASE SCHEDULE 30 SLOT WITH NEXT AVAILABLE WITH DR JASSO HER LAST DAY WILL BE ON MARCH 28TH. THANK YOU

## 2024-02-28 NOTE — TELEPHONE ENCOUNTER
Called and s/w patient. She states she is unable to come in today. She has another appointment today in [place]. She is scheduled to come in on March 5, 2024 to see PCP. Patient was advised if symptoms/conditions worsen she needs to go to ER or Urgent Care. Patient voiced understanding. No further questions/concerns at this time.

## 2024-03-02 ENCOUNTER — LAB (OUTPATIENT)
Dept: LAB | Facility: HOSPITAL | Age: 69
End: 2024-03-02
Payer: MEDICARE

## 2024-03-02 DIAGNOSIS — K51.419: ICD-10-CM

## 2024-03-02 DIAGNOSIS — R19.5 MUCUS IN STOOL: ICD-10-CM

## 2024-03-02 LAB
ADV 40+41 DNA STL QL NAA+NON-PROBE: NOT DETECTED
ASTRO TYP 1-8 RNA STL QL NAA+NON-PROBE: NOT DETECTED
C CAYETANENSIS DNA STL QL NAA+NON-PROBE: NOT DETECTED
C COLI+JEJ+UPSA DNA STL QL NAA+NON-PROBE: NOT DETECTED
CRYPTOSP DNA STL QL NAA+NON-PROBE: NOT DETECTED
E HISTOLYT DNA STL QL NAA+NON-PROBE: NOT DETECTED
EAEC PAA PLAS AGGR+AATA ST NAA+NON-PRB: NOT DETECTED
EC STX1+STX2 GENES STL QL NAA+NON-PROBE: NOT DETECTED
EPEC EAE GENE STL QL NAA+NON-PROBE: DETECTED
ETEC LTA+ST1A+ST1B TOX ST NAA+NON-PROBE: NOT DETECTED
G LAMBLIA DNA STL QL NAA+NON-PROBE: NOT DETECTED
NOROVIRUS GI+II RNA STL QL NAA+NON-PROBE: NOT DETECTED
P SHIGELLOIDES DNA STL QL NAA+NON-PROBE: NOT DETECTED
RVA RNA STL QL NAA+NON-PROBE: NOT DETECTED
S ENT+BONG DNA STL QL NAA+NON-PROBE: NOT DETECTED
SAPO I+II+IV+V RNA STL QL NAA+NON-PROBE: NOT DETECTED
SHIGELLA SP+EIEC IPAH ST NAA+NON-PROBE: NOT DETECTED
V CHOL+PARA+VUL DNA STL QL NAA+NON-PROBE: NOT DETECTED
V CHOLERAE DNA STL QL NAA+NON-PROBE: NOT DETECTED
Y ENTEROCOL DNA STL QL NAA+NON-PROBE: NOT DETECTED

## 2024-03-02 PROCEDURE — 87507 IADNA-DNA/RNA PROBE TQ 12-25: CPT

## 2024-03-04 DIAGNOSIS — A04.0 INTESTINAL INFECTION DUE TO ENTEROPATHOGENIC E. COLI: Primary | ICD-10-CM

## 2024-03-04 RX ORDER — AMOXICILLIN AND CLAVULANATE POTASSIUM 875; 125 MG/1; MG/1
1 TABLET, FILM COATED ORAL 2 TIMES DAILY
Qty: 20 TABLET | Refills: 0 | Status: SHIPPED | OUTPATIENT
Start: 2024-03-04 | End: 2024-03-14

## 2024-03-05 ENCOUNTER — OFFICE VISIT (OUTPATIENT)
Dept: FAMILY MEDICINE CLINIC | Facility: CLINIC | Age: 69
End: 2024-03-05
Payer: MEDICARE

## 2024-03-05 VITALS
HEIGHT: 64 IN | SYSTOLIC BLOOD PRESSURE: 137 MMHG | WEIGHT: 170 LBS | BODY MASS INDEX: 29.02 KG/M2 | RESPIRATION RATE: 16 BRPM | HEART RATE: 83 BPM | OXYGEN SATURATION: 94 % | DIASTOLIC BLOOD PRESSURE: 89 MMHG | TEMPERATURE: 97.3 F

## 2024-03-05 DIAGNOSIS — K21.9 GASTROESOPHAGEAL REFLUX DISEASE WITHOUT ESOPHAGITIS: ICD-10-CM

## 2024-03-05 DIAGNOSIS — I10 BENIGN HYPERTENSION: Primary | ICD-10-CM

## 2024-03-05 DIAGNOSIS — E78.2 MIXED HYPERLIPIDEMIA: ICD-10-CM

## 2024-03-05 PROCEDURE — 3079F DIAST BP 80-89 MM HG: CPT | Performed by: FAMILY MEDICINE

## 2024-03-05 PROCEDURE — 99214 OFFICE O/P EST MOD 30 MIN: CPT | Performed by: FAMILY MEDICINE

## 2024-03-05 PROCEDURE — 3075F SYST BP GE 130 - 139MM HG: CPT | Performed by: FAMILY MEDICINE

## 2024-03-05 RX ORDER — TRIAMCINOLONE ACETONIDE 55 UG/1
2 SPRAY, METERED NASAL DAILY PRN
COMMUNITY
Start: 2024-02-29

## 2024-03-05 RX ORDER — OMEPRAZOLE 40 MG/1
40 CAPSULE, DELAYED RELEASE ORAL DAILY
Qty: 90 CAPSULE | Refills: 0 | Status: SHIPPED | OUTPATIENT
Start: 2024-03-05

## 2024-03-05 NOTE — PROGRESS NOTES
Meenakshi Pichardo presents to [facility]      Chief Complaint  HTN    HPI (Evaluated at urgent care for black stool with blood.  Evaluation showed pathogenic E. Coli.  Prescribed Augmentin)    Respiratory:  SOA:  no. Exertional dyspnea: no.  Dyspnea at rest: no.  Flights of stairs climbable: 3.  Cardiology:  Chest pain: no.  Dizziness: no. Easily fatigued: no.  Pedal edema: no.  Light-headiness: no.  Symptoms of claudication: no.  Orthopnea: no.  Palpitations: no. Tachycardia: no.  Ophthalmology:  Last eye exam date: Autumn 2023 . Vision changes: none.  Gastroenterology:  Heartburn: no.  Nausea: no.        Current Outpatient Medications:     amoxicillin-clavulanate (AUGMENTIN) 875-125 MG per tablet, Take 1 tablet by mouth 2 (Two) Times a Day for 10 days., Disp: 20 tablet, Rfl: 0    atorvastatin (LIPITOR) 10 MG tablet, Take 1 tablet by mouth Daily., Disp: 90 tablet, Rfl: 3    azelastine (ASTELIN) 0.1 % nasal spray, 2 sprays into the nostril(s) as directed by provider 2 (Two) Times a Day. Use in each nostril as directed, Disp: 30 mL, Rfl: 1    cetirizine (zyrTEC) 10 MG tablet, Take 1 tablet by mouth Daily., Disp: 90 tablet, Rfl: 3    cholecalciferol (VITAMIN D3) 25 MCG (1000 UT) tablet, Take 1 tablet by mouth Daily., Disp: , Rfl:     hydrocortisone (ANUSOL-HC) 25 MG suppository, Insert 1 suppository into the rectum 2 (Two) Times a Day for 7 days., Disp: 14 suppository, Rfl: 0    montelukast (Singulair) 10 MG tablet, Take 1 tablet by mouth Every Night., Disp: 90 tablet, Rfl: 3    omeprazole (priLOSEC) 40 MG capsule, Take 1 capsule by mouth Daily., Disp: 90 capsule, Rfl: 0    polyethylene glycol (MIRALAX) 17 g packet, Take 17 g by mouth Daily for 30 days., Disp: 30 packet, Rfl: 0    Probiotic Product (PROBIOTIC DAILY PO), Take  by mouth Daily., Disp: , Rfl:     tacrolimus (PROTOPIC) 0.1 % ointment, Apply 1 application topically to the appropriate area as directed 2 (Two) Times a Day As  Needed (Inflammatory dermatitis)., Disp: 60 g, Rfl: 0    Triamcinolone Acetonide (NASACORT) 55 MCG/ACT nasal inhaler, 2 sprays into the nostril(s) as directed by provider Daily As Needed., Disp: , Rfl:     valsartan-hydrochlorothiazide (Diovan HCT) 160-25 MG per tablet, Take 1 tablet by mouth Daily., Disp: 90 tablet, Rfl: 0     Allergies   Allergen Reactions    Percocet [Oxycodone-Acetaminophen] Nausea And Vomiting and Other (See Comments)     Muscle Weakness     Contrast Dye (Echo Or Unknown Ct/Mr) Hives and Swelling        Past Medical History:   Diagnosis Date    Vitamin D deficiency     Wears glasses        Past Surgical History:   Procedure Laterality Date    BLADDER SUSPENSION      BREAST LUMPECTOMY  1981    Benign breast nodule    COLONOSCOPY N/A 08/23/2022    Procedure: COLONOSCOPY WITH POLYPECTOMY X 3;  Surgeon: Shireen Gruber MD;  Location: [facility] ENDOSCOPY;  Service: Gastroenterology;  Laterality: N/A;  POLYPS, DIVERTICULOSIS, HEMORRHOIDS, PROLAPSE    COLONOSCOPY W/ BIOPSIES  06/2011    Benign Polyps    COLONOSCOPY W/ BIOPSIES  02/2018    Benign Polyps; Repeat Colonoscopy 2023    ENDOSCOPY  06/2011    ENDOSCOPY  02/2018    Duodenal ulcer    TOTAL ABDOMINAL HYSTERECTOMY WITH SALPINGO OOPHORECTOMY Bilateral 1982    ENDOMETRIOSIS    TUMOR EXCISION  09/2022    On top lip- benign        Family History   Problem Relation Age of Onset    Cancer Mother     Hypertension Sister     Diabetes Sister     Diabetes Sister     Kidney failure Sister         Required dialysis    Fibromyalgia Sister     Hypertension Brother     No Known Problems Brother     No Known Problems Son     No Known Problems Son     No Known Problems Son     No Known Problems Son         Social History     Socioeconomic History    Marital status:    Tobacco Use    Smoking status: Never    Smokeless tobacco: Never   Vaping Use    Vaping status: Never Used   Substance and Sexual Activity    Alcohol use: Never    Drug use: Not Currently  "    Comment: Tried Marijuana - NO current illegal/recreational at this time.    Sexual activity: Defer            Objective   Vital Signs:  /89 (BP Location: Left arm, Patient Position: Sitting, Cuff Size: Adult)   Pulse 83   Temp 97.3 °F (36.3 °C) (Infrared)   Resp 16   Ht 162.6 cm (64\")   Wt 77.1 kg (170 lb)   SpO2 94%   BMI 29.18 kg/m²   Estimated body mass index is 29.18 kg/m² as calculated from the following:    Height as of this encounter: 162.6 cm (64\").    Weight as of this encounter: 77.1 kg (170 lb).        Physical Exam  General:  No acute distress, cheerful, well kept, interactive, good energy level.  Cardiac:  Heart regular rate and rhythm without murmur.  Respiratory:  Lungs clear to auscultation bilaterally and excellent air movement throughout.  Pulses:                            Right          Left         Bruit  Carotid                2+              2+           No  Radial                 2+              2+  Abd Ao               Not bounding  Femoral             Not palpable bilaterally  DP                      2+              2+  PT                      2+              1+  Venous:  Absent JVD. Absent lower extremity varicosities.  Dermatology:  Non-dystrophic toe nails. No Integument changes of the toes, feet, ankles, shins.  Lower Extremities:  No leg edema.  Toes are warm and pink    Result Review :                    Assessment and Plan   Diagnoses and all orders for this visit:    1. Benign hypertension (Primary)    2. Gastroesophageal reflux disease without esophagitis  -     omeprazole (priLOSEC) 40 MG capsule; Take 1 capsule by mouth Daily.  Dispense: 90 capsule; Refill: 0    3. Mixed hyperlipidemia             Follow Up   Return in about 3 months (around 6/5/2024) for Cor.    Patient was given instructions and counseling regarding her condition or for health maintenance advice. Please see specific information pulled into the AVS if appropriate.     Suzie Ching MD     "

## 2024-03-15 DIAGNOSIS — J30.1 NON-SEASONAL ALLERGIC RHINITIS DUE TO POLLEN: ICD-10-CM

## 2024-03-15 DIAGNOSIS — E78.2 MIXED HYPERLIPIDEMIA: ICD-10-CM

## 2024-03-15 RX ORDER — CETIRIZINE HYDROCHLORIDE 10 MG/1
10 TABLET ORAL DAILY
Qty: 90 TABLET | Refills: 3 | Status: SHIPPED | OUTPATIENT
Start: 2024-03-15

## 2024-03-15 RX ORDER — ATORVASTATIN CALCIUM 10 MG/1
10 TABLET, FILM COATED ORAL DAILY
Qty: 90 TABLET | Refills: 0 | Status: SHIPPED | OUTPATIENT
Start: 2024-03-15

## 2024-03-15 NOTE — TELEPHONE ENCOUNTER
Rx Refill Note  Requested Prescriptions     Pending Prescriptions Disp Refills    atorvastatin (LIPITOR) 10 MG tablet [Pharmacy Med Name: Atorvastatin Calcium 10 MG Oral Tablet] 90 tablet 0     Sig: Take 1 tablet by mouth once daily    cetirizine (zyrTEC) 10 MG tablet [Pharmacy Med Name: Cetirizine HCl 10 MG Oral Tablet] 90 tablet 0     Sig: Take 1 tablet by mouth once daily      Last office visit with prescribing clinician: 3/5/2024   Last telemedicine visit with prescribing clinician: Visit date not found   Next office visit with prescribing clinician: Visit date not found     Lipid Panel (12/15/2022 08:58)     Would you like a call back once the refill request has been completed: [] Yes [] No    If the office needs to give you a call back, can they leave a voicemail: [] Yes [] No    Angelita King CMA  03/15/24, 08:26 EDT

## 2024-04-27 DIAGNOSIS — I10 BENIGN HYPERTENSION: ICD-10-CM

## 2024-04-29 RX ORDER — VALSARTAN AND HYDROCHLOROTHIAZIDE 160; 25 MG/1; MG/1
1 TABLET ORAL DAILY
Qty: 90 TABLET | Refills: 0 | OUTPATIENT
Start: 2024-04-29

## 2024-06-19 DIAGNOSIS — E78.2 MIXED HYPERLIPIDEMIA: ICD-10-CM

## 2024-06-19 RX ORDER — ATORVASTATIN CALCIUM 10 MG/1
10 TABLET, FILM COATED ORAL DAILY
Qty: 90 TABLET | Refills: 0 | OUTPATIENT
Start: 2024-06-19

## 2024-07-23 ENCOUNTER — TRANSCRIBE ORDERS (OUTPATIENT)
Dept: ADMINISTRATIVE | Facility: HOSPITAL | Age: 69
End: 2024-07-23
Payer: MEDICARE

## 2024-07-23 DIAGNOSIS — Z12.31 VISIT FOR SCREENING MAMMOGRAM: ICD-10-CM

## 2024-07-23 DIAGNOSIS — Z78.0 POST-MENOPAUSAL: Primary | ICD-10-CM

## 2024-09-04 ENCOUNTER — EXTERNAL PBMM DATA (OUTPATIENT)
Dept: PHARMACY | Facility: OTHER | Age: 69
End: 2024-09-04
Payer: MEDICARE

## 2024-12-26 ENCOUNTER — HOSPITAL ENCOUNTER (OUTPATIENT)
Dept: BONE DENSITY | Facility: HOSPITAL | Age: 69
Discharge: HOME | End: 2024-12-26
Payer: MEDICARE

## 2024-12-26 ENCOUNTER — HOSPITAL ENCOUNTER (OUTPATIENT)
Dept: MAMMOGRAPHY | Facility: HOSPITAL | Age: 69
Discharge: HOME | End: 2024-12-26
Payer: MEDICARE

## 2024-12-26 DIAGNOSIS — Z78.0 POST-MENOPAUSAL: ICD-10-CM

## 2024-12-26 DIAGNOSIS — Z12.31 VISIT FOR SCREENING MAMMOGRAM: ICD-10-CM

## 2024-12-26 PROCEDURE — 77067 SCR MAMMO BI INCL CAD: CPT

## 2024-12-26 PROCEDURE — 77063 BREAST TOMOSYNTHESIS BI: CPT

## 2024-12-26 PROCEDURE — 77080 DXA BONE DENSITY AXIAL: CPT

## 2025-03-20 ENCOUNTER — OFFICE (OUTPATIENT)
Dept: URBAN - METROPOLITAN AREA CLINIC 64 | Facility: CLINIC | Age: 70
End: 2025-03-20
Payer: MEDICARE

## 2025-03-20 VITALS
HEART RATE: 77 BPM | WEIGHT: 173 LBS | HEIGHT: 65 IN | DIASTOLIC BLOOD PRESSURE: 73 MMHG | SYSTOLIC BLOOD PRESSURE: 118 MMHG

## 2025-03-20 DIAGNOSIS — R14.0 ABDOMINAL DISTENSION (GASEOUS): ICD-10-CM

## 2025-03-20 DIAGNOSIS — K59.00 CONSTIPATION, UNSPECIFIED: ICD-10-CM

## 2025-03-20 PROCEDURE — 99214 OFFICE O/P EST MOD 30 MIN: CPT

## 2025-07-23 ENCOUNTER — OFFICE VISIT (OUTPATIENT)
Dept: ORTHOPEDIC SURGERY | Facility: CLINIC | Age: 70
End: 2025-07-23
Payer: MEDICARE

## 2025-07-23 VITALS — HEART RATE: 85 BPM | OXYGEN SATURATION: 95 % | HEIGHT: 64 IN | WEIGHT: 168 LBS | BODY MASS INDEX: 28.68 KG/M2

## 2025-07-23 DIAGNOSIS — M25.561 CHRONIC PAIN OF RIGHT KNEE: Primary | ICD-10-CM

## 2025-07-23 DIAGNOSIS — G89.29 CHRONIC PAIN OF RIGHT KNEE: Primary | ICD-10-CM

## 2025-07-23 RX ORDER — LIDOCAINE HYDROCHLORIDE 10 MG/ML
2 INJECTION, SOLUTION EPIDURAL; INFILTRATION; INTRACAUDAL; PERINEURAL
Status: COMPLETED | OUTPATIENT
Start: 2025-07-23 | End: 2025-07-23

## 2025-07-23 RX ORDER — MELOXICAM 15 MG/1
15 TABLET ORAL DAILY
Qty: 14 TABLET | Refills: 0 | Status: SHIPPED | OUTPATIENT
Start: 2025-07-23 | End: 2025-08-06

## 2025-07-23 RX ORDER — TRIAMCINOLONE ACETONIDE 40 MG/ML
80 INJECTION, SUSPENSION INTRA-ARTICULAR; INTRAMUSCULAR
Status: COMPLETED | OUTPATIENT
Start: 2025-07-23 | End: 2025-07-23

## 2025-07-23 RX ADMIN — LIDOCAINE HYDROCHLORIDE 2 ML: 10 INJECTION, SOLUTION EPIDURAL; INFILTRATION; INTRACAUDAL; PERINEURAL at 13:06

## 2025-07-23 RX ADMIN — TRIAMCINOLONE ACETONIDE 80 MG: 40 INJECTION, SUSPENSION INTRA-ARTICULAR; INTRAMUSCULAR at 13:06

## 2025-07-23 NOTE — PROGRESS NOTES
[facility]        Patient Name: Meenkashi Pichardo  : 1955  Primary Care Physician: Karina Tolbert MD        Chief Complaint:    Chief Complaint   Patient presents with    Right Knee - Initial Evaluation     Pain for about 1 year        HPI:   History of Present Illness  The patient presents for evaluation of right knee pain. She is accompanied by her sister-in-law.    She reports experiencing pain in her right leg, predominantly in the knee, which can persist throughout the day if she remains standing for extended periods. The pain is localized to the middle of the knee, with a specific sore spot. This discomfort has been present for approximately a year, during which an x-ray revealed signs of arthritis. She finds it difficult to squat or kneel due to the pain, which has progressively worsened over time. She also experiences a sensation of pulling or stretching in her entire leg.     She reports no known hip issues but mentions that her doctor plans to examine her hip after addressing the knee problem. She also experiences constant lower back pain and some pain around her panty line. She has neuropathy and experiences numbness and tingling in her feet, which may extend down her leg. She occasionally feels instability in her knee when ascending stairs, but this has not resulted in any falls. She recalls an incident last summer where she jumped off the back of a pickup truck, which exacerbated her knee pain and has been causing her issues since then. She has been managing the pain with Aleve and a prescribed cream, which provide some relief but do not eliminate the pain. Applying pressure to the back of her knee seems to alleviate the pain temporarily.            Past Medical/Surgical, Social and Family History:  I have reviewed and/or updated pertinent history as noted in the medical record including:  Past Medical History:   Diagnosis Date    Knee swelling May 2025    Vitamin D deficiency      Wears glasses      Past Surgical History:   Procedure Laterality Date    BLADDER SUSPENSION      BREAST LUMPECTOMY  1981    Benign breast nodule    COLONOSCOPY N/A 08/23/2022    Procedure: COLONOSCOPY WITH POLYPECTOMY X 3;  Surgeon: Shireen Gruber MD;  Location: [facility] ENDOS[facility];  Service: Gastroenterology;  Laterality: N/A;  POLYPS, DIVERTICULOSIS, HEMORRHOIDS, PROLAPSE    COLONOSCOPY W/ BIOPSIES  06/2011    Benign Polyps    COLONOSCOPY W/ BIOPSIES  02/2018    Benign Polyps; Repeat Colonoscopy 2023    ENDOSCOPY  06/2011    ENDOSCOPY  02/2018    Duodenal ulcer    TOTAL ABDOMINAL HYSTERECTOMY WITH SALPINGO OOPHORECTOMY Bilateral 1982    ENDOMETRIOSIS    TRIGGER POINT INJECTION  2023    TUMOR EXCISION  09/2022    On top lip- benign     Social History     Occupational History    Not on file   Tobacco Use    Smoking status: Never    Smokeless tobacco: Never   Vaping Use    Vaping status: Never Used   Substance and Sexual Activity    Alcohol use: Never    Drug use: Never     Comment: Tried Marijuana - NO current illegal/recreational at this time.    Sexual activity: Yes     Partners: Male     Birth control/protection: Hysterectomy          Allergies:   Allergies   Allergen Reactions    Percocet [Oxycodone-Acetaminophen] Nausea And Vomiting and Other (See Comments)     Muscle Weakness     Contrast Dye (Echo Or Unknown Ct/Mr) Hives and Swelling       Medications:   Home Medications:  Current Outpatient Medications on File Prior to Visit   Medication Sig    atorvastatin (LIPITOR) 10 MG tablet Take 1 tablet by mouth once daily    azelastine (ASTELIN) 0.1 % nasal spray 2 sprays into the nostril(s) as directed by provider 2 (Two) Times a Day. Use in each nostril as directed    cetirizine (zyrTEC) 10 MG tablet Take 1 tablet by mouth once daily    cholecalciferol (VITAMIN D3) 25 MCG (1000 UT) tablet Take 1 tablet by mouth Daily.    montelukast (Singulair) 10 MG tablet Take 1 tablet by mouth Every Night.    omeprazole  (priLOSEC) 40 MG capsule Take 1 capsule by mouth Daily.    Probiotic Product (PROBIOTIC DAILY PO) Take  by mouth Daily.    tacrolimus (PROTOPIC) 0.1 % ointment Apply 1 application topically to the appropriate area as directed 2 (Two) Times a Day As Needed (Inflammatory dermatitis).    Triamcinolone Acetonide (NASACORT) 55 MCG/ACT nasal inhaler Administer 2 sprays into the nostril(s) as directed by provider Daily As Needed.    valsartan-hydrochlorothiazide (Diovan HCT) 160-25 MG per tablet Take 1 tablet by mouth Daily.     No current facility-administered medications on file prior to visit.         ROS:  Negative unless listed in the HPI    Physical Exam:   69 y.o. female  Body mass index is 28.84 kg/m²., 76.2 kg (168 lb)  Vitals:    07/23/25 1219   Pulse: 85   SpO2: 95%     General: Alert, cooperative, appears well and in no observable distress.   HEENT: Normocephalic, atraumatic on external visual inspection. No icterus.   CV: No significant peripheral edema.    Respiratory: Normal respiratory effort.   Skin: Warm & well perfused; appropriate skin turgor.  Psych: Appropriate mood & affect.  Neuro: Gross sensation and motor intact in affected extremity/extremities.  Vascular: Peripheral pulses palpable in affected extremity/extremities.     Right Knee Exam     Muscle Strength   The patient has normal right knee strength.    Tests   Inder:  Medial - negative Lateral - negative             Knee Musculoskeletal Exam  Gait    Gait is normal.    Inspection    Right      Right knee inspection is normal.      Palpation    Right      Increased warmth: none      Masses: none        Crepitus: none        Tenderness: present          Lateral joint line: mild          Lateral retinaculum: mild          Medial joint line: mild          Medial retinaculum: mild          Patellar tendon: mild          Quadriceps tendon: mild      Range of Motion    Right      Active extension: 0      Active flexion: 130    Strength    Right       Right knee strength is normal.      Instability    Right      Varus stress grade: normal      Valgus stress grade: normal      Medial Inder test: negative      Lateral Inder test: negative      Lachman: negative    Radiology:  Outside imaging reviewed  Minimal degenerative changes  No acute injury     Large Joint Arthrocentesis: R knee  Date/Time: 7/23/2025 1:06 PM  Consent given by: patient  Site marked: site marked  Timeout: Immediately prior to procedure a time out was called to verify the correct patient, procedure, equipment, support staff and site/side marked as required   Supporting Documentation  Indications: pain and diagnostic evaluation   Procedure Details  Location: knee - R knee  Preparation: Patient was prepped and draped in the usual sterile fashion  Needle size: 25 G  Approach: anterolateral  Medications administered: 2 mL lidocaine PF 1% 1 %; 80 mg triamcinolone acetonide 40 MG/ML  Patient tolerance: patient tolerated the procedure well with no immediate complications          Assessment:  Assessment & Plan  1. Right knee pain:  The x-ray of the right knee appears normal, with no significant structural abnormalities. Minimal arthritis is present, which might cause occasional discomfort during prolonged standing. Symptoms suggest a possible case of tendinitis, potentially exacerbated by activities such as squatting and kneeling. The tendon may be inflamed and irritated, contributing to the pain and tightness experienced in the leg. There is no indication of instability or significant structural damage.    A prescription for meloxicam has been provided to be taken once daily for 2 to 3 weeks to reduce inflammation. A steroid injection will be administered today to provide more immediate relief by directly targeting the inflamed tendon. Rest is recommended when experiencing pain, and strenuous activities that may worsen the condition should be avoided. If there is no improvement after the  initial treatment, further diagnostic tests such as an ultrasound or MRI may be considered. Physical therapy may also be an option if symptoms persist. Patient education includes understanding the nature of tendinitis, the importance of adhering to the medication regimen, and recognizing the signs of worsening symptoms that would necessitate further evaluation.    PROCEDURE  Steroid injection was administered in the right knee today.    Body mass index is 28.84 kg/m².  BMI consistent with Overweight: 25.0-29.9kg/m2   BMI is >= 25 and <30. (Overweight) The following options were offered after discussion;: information on healthy weight added to patient's after visit summary     Patient encouraged to call with any questions or concerns in the interim    NIKO Lerma     Patient or patient representative verbalized consent for the use of Ambient Listening during the visit with  NIKO Lerma for chart documentation. 7/23/2025  13:01 EDT

## 2025-07-24 ENCOUNTER — PATIENT ROUNDING (BHMG ONLY) (OUTPATIENT)
Dept: ORTHOPEDIC SURGERY | Facility: CLINIC | Age: 70
End: 2025-07-24
Payer: MEDICARE

## 2025-08-27 ENCOUNTER — TRANSCRIBE ORDERS (OUTPATIENT)
Dept: ADMINISTRATIVE | Facility: HOSPITAL | Age: 70
End: 2025-08-27
Payer: MEDICARE

## 2025-08-27 DIAGNOSIS — Z12.31 ENCOUNTER FOR SCREENING MAMMOGRAM FOR MALIGNANT NEOPLASM OF BREAST: Primary | ICD-10-CM

## 2025-09-03 ENCOUNTER — ON CAMPUS - OUTPATIENT (AMBULATORY)
Dept: URBAN - METROPOLITAN AREA HOSPITAL 2 | Facility: HOSPITAL | Age: 70
End: 2025-09-03
Payer: MEDICARE

## 2025-09-03 ENCOUNTER — OFFICE (AMBULATORY)
Dept: URBAN - METROPOLITAN AREA PATHOLOGY 19 | Facility: PATHOLOGY | Age: 70
End: 2025-09-03
Payer: MEDICARE

## 2025-09-03 VITALS
DIASTOLIC BLOOD PRESSURE: 87 MMHG | SYSTOLIC BLOOD PRESSURE: 142 MMHG | OXYGEN SATURATION: 98 % | RESPIRATION RATE: 20 BRPM | TEMPERATURE: 97.5 F | SYSTOLIC BLOOD PRESSURE: 146 MMHG | SYSTOLIC BLOOD PRESSURE: 160 MMHG | HEART RATE: 91 BPM | OXYGEN SATURATION: 96 % | HEART RATE: 92 BPM | OXYGEN SATURATION: 99 % | HEART RATE: 71 BPM | SYSTOLIC BLOOD PRESSURE: 165 MMHG | DIASTOLIC BLOOD PRESSURE: 100 MMHG | DIASTOLIC BLOOD PRESSURE: 94 MMHG | HEART RATE: 81 BPM | SYSTOLIC BLOOD PRESSURE: 140 MMHG | DIASTOLIC BLOOD PRESSURE: 93 MMHG | HEART RATE: 87 BPM | WEIGHT: 164 LBS | SYSTOLIC BLOOD PRESSURE: 169 MMHG | RESPIRATION RATE: 16 BRPM | HEART RATE: 90 BPM | RESPIRATION RATE: 17 BRPM | OXYGEN SATURATION: 100 % | SYSTOLIC BLOOD PRESSURE: 155 MMHG | DIASTOLIC BLOOD PRESSURE: 90 MMHG | DIASTOLIC BLOOD PRESSURE: 98 MMHG | DIASTOLIC BLOOD PRESSURE: 95 MMHG | DIASTOLIC BLOOD PRESSURE: 92 MMHG | HEART RATE: 72 BPM | HEART RATE: 93 BPM | DIASTOLIC BLOOD PRESSURE: 96 MMHG | HEIGHT: 65 IN | HEART RATE: 76 BPM

## 2025-09-03 DIAGNOSIS — Z86.0101 PERSONAL HISTORY OF ADENOMATOUS AND SERRATED COLON POLYPS: ICD-10-CM

## 2025-09-03 DIAGNOSIS — K63.5 POLYP OF COLON: ICD-10-CM

## 2025-09-03 DIAGNOSIS — Z09 ENCOUNTER FOR FOLLOW-UP EXAMINATION AFTER COMPLETED TREATMEN: ICD-10-CM

## 2025-09-03 DIAGNOSIS — D12.0 BENIGN NEOPLASM OF CECUM: ICD-10-CM

## 2025-09-03 PROBLEM — Z86.010 SURVEILLANCE DUE TO PRIOR COLONIC NEOPLASIA: Status: ACTIVE | Noted: 2025-09-03

## 2025-09-03 LAB
GI HISTOLOGY: A. CECUM: (no result)
GI HISTOLOGY: B. DESCENDING COLON: (no result)
GI HISTOLOGY: C. DISTAL DESCENDING COLON: (no result)
GI HISTOLOGY: PDF REPORT: (no result)

## 2025-09-03 PROCEDURE — 45382 COLONOSCOPY W/CONTROL BLEED: CPT | Mod: 59,PT | Performed by: INTERNAL MEDICINE

## 2025-09-03 PROCEDURE — 88305 TISSUE EXAM BY PATHOLOGIST: CPT | Mod: 26 | Performed by: PATHOLOGY

## 2025-09-03 PROCEDURE — 45385 COLONOSCOPY W/LESION REMOVAL: CPT | Mod: PT | Performed by: INTERNAL MEDICINE

## (undated) DEVICE — PK ENDO GI 50

## (undated) DEVICE — SNAR POLYP HOTSNARE/BRAIDED OVL/MINI 7F 2.8X10MM 230CM 1P/U

## (undated) DEVICE — TRAP WIDEEYE POLYP